# Patient Record
Sex: MALE | Race: WHITE | URBAN - METROPOLITAN AREA
[De-identification: names, ages, dates, MRNs, and addresses within clinical notes are randomized per-mention and may not be internally consistent; named-entity substitution may affect disease eponyms.]

---

## 2017-05-17 ENCOUNTER — HOSPITAL ENCOUNTER (OUTPATIENT)
Dept: NON INVASIVE DIAGNOSTICS | Age: 67
Discharge: HOME | End: 2017-05-17
Attending: INTERNAL MEDICINE | Admitting: INTERNAL MEDICINE

## 2017-05-17 ENCOUNTER — OFFICE VISIT (OUTPATIENT)
Dept: CARDIOLOGY CLINIC | Age: 67
End: 2017-05-17

## 2017-05-17 ENCOUNTER — HOSPITAL ENCOUNTER (OUTPATIENT)
Dept: OTHER | Age: 67
Discharge: HOME | End: 2017-05-17
Attending: INTERNAL MEDICINE | Admitting: INTERNAL MEDICINE

## 2017-05-17 VITALS
BODY MASS INDEX: 31.71 KG/M2 | HEIGHT: 67 IN | HEART RATE: 80 BPM | SYSTOLIC BLOOD PRESSURE: 106 MMHG | WEIGHT: 202 LBS | DIASTOLIC BLOOD PRESSURE: 72 MMHG

## 2017-05-17 DIAGNOSIS — I25.10 CORONARY ARTERY DISEASE INVOLVING NATIVE CORONARY ARTERY OF NATIVE HEART WITHOUT ANGINA PECTORIS: ICD-10-CM

## 2017-05-17 DIAGNOSIS — I25.5 CARDIOMYOPATHY, ISCHEMIC: ICD-10-CM

## 2017-05-17 DIAGNOSIS — E78.2 MIXED HYPERLIPIDEMIA: ICD-10-CM

## 2017-05-17 DIAGNOSIS — I50.22 SYSTOLIC CHF, CHRONIC (HCC): ICD-10-CM

## 2017-05-17 DIAGNOSIS — I50.22 CHRONIC SYSTOLIC CONGESTIVE HEART FAILURE (HCC): ICD-10-CM

## 2017-05-17 DIAGNOSIS — I50.22 SYSTOLIC CHF, CHRONIC (HCC): Primary | ICD-10-CM

## 2017-05-17 LAB
ANION GAP SERPL CALCULATED.3IONS-SCNC: 12 MMOL/L (ref 3–16)
BUN BLDV-MCNC: 18 MG/DL (ref 7–20)
CALCIUM SERPL-MCNC: 8.7 MG/DL (ref 8.3–10.6)
CHLORIDE BLD-SCNC: 103 MMOL/L (ref 99–110)
CO2: 26 MMOL/L (ref 21–32)
CREAT SERPL-MCNC: 1.4 MG/DL (ref 0.8–1.3)
GFR AFRICAN AMERICAN: >60
GFR NON-AFRICAN AMERICAN: 51
GLUCOSE BLD-MCNC: 145 MG/DL (ref 70–99)
HCT VFR BLD CALC: 48 % (ref 40.5–52.5)
HEMOGLOBIN: 15.5 G/DL (ref 13.5–17.5)
MCH RBC QN AUTO: 29.9 PG (ref 26–34)
MCHC RBC AUTO-ENTMCNC: 32.3 G/DL (ref 31–36)
MCV RBC AUTO: 92.4 FL (ref 80–100)
PDW BLD-RTO: 13.4 % (ref 12.4–15.4)
PLATELET # BLD: 145 K/UL (ref 135–450)
PMV BLD AUTO: 9.7 FL (ref 5–10.5)
POTASSIUM SERPL-SCNC: 4.8 MMOL/L (ref 3.5–5.1)
PRO-BNP: 542 PG/ML (ref 0–124)
RBC # BLD: 5.2 M/UL (ref 4.2–5.9)
SODIUM BLD-SCNC: 141 MMOL/L (ref 136–145)
WBC # BLD: 7 K/UL (ref 4–11)

## 2017-05-17 PROCEDURE — 99214 OFFICE O/P EST MOD 30 MIN: CPT | Performed by: INTERNAL MEDICINE

## 2017-05-17 RX ORDER — GLIPIZIDE 5 MG/1
5 TABLET ORAL DAILY
COMMUNITY
End: 2019-10-10 | Stop reason: ALTCHOICE

## 2017-05-17 RX ORDER — ACETAMINOPHEN 500 MG
500 TABLET ORAL EVERY 6 HOURS PRN
COMMUNITY

## 2017-05-28 DIAGNOSIS — I25.10 CORONARY ARTERY DISEASE INVOLVING NATIVE CORONARY ARTERY OF NATIVE HEART WITHOUT ANGINA PECTORIS: ICD-10-CM

## 2017-05-28 DIAGNOSIS — E78.5 HYPERLIPIDEMIA: ICD-10-CM

## 2017-05-28 DIAGNOSIS — I25.5 CARDIOMYOPATHY, ISCHEMIC: ICD-10-CM

## 2017-05-28 DIAGNOSIS — N18.9 CKD (CHRONIC KIDNEY DISEASE), UNSPECIFIED STAGE: ICD-10-CM

## 2017-05-28 DIAGNOSIS — I50.22 SYSTOLIC CHF, CHRONIC (HCC): ICD-10-CM

## 2017-05-30 RX ORDER — VALSARTAN 320 MG/1
TABLET ORAL
Qty: 30 TABLET | Refills: 4 | OUTPATIENT
Start: 2017-05-30

## 2017-06-08 ENCOUNTER — HOSPITAL ENCOUNTER (OUTPATIENT)
Dept: OTHER | Age: 67
Discharge: HOME | End: 2017-06-08
Attending: NURSE PRACTITIONER | Admitting: NURSE PRACTITIONER

## 2017-06-08 ENCOUNTER — OFFICE VISIT (OUTPATIENT)
Dept: CARDIOLOGY CLINIC | Age: 67
End: 2017-06-08

## 2017-06-08 VITALS
BODY MASS INDEX: 31.86 KG/M2 | OXYGEN SATURATION: 97 % | WEIGHT: 203 LBS | SYSTOLIC BLOOD PRESSURE: 108 MMHG | DIASTOLIC BLOOD PRESSURE: 60 MMHG | HEART RATE: 82 BPM | HEIGHT: 67 IN | RESPIRATION RATE: 16 BRPM

## 2017-06-08 DIAGNOSIS — I27.20 PULMONARY HYPERTENSION (HCC): ICD-10-CM

## 2017-06-08 DIAGNOSIS — I50.22 SYSTOLIC CHF, CHRONIC (HCC): Primary | ICD-10-CM

## 2017-06-08 DIAGNOSIS — I42.9 CARDIOMYOPATHY (HCC): ICD-10-CM

## 2017-06-08 DIAGNOSIS — I25.10 CORONARY ARTERY DISEASE INVOLVING NATIVE CORONARY ARTERY OF NATIVE HEART WITHOUT ANGINA PECTORIS: ICD-10-CM

## 2017-06-08 LAB
ANION GAP SERPL CALCULATED.3IONS-SCNC: 13 MMOL/L (ref 3–16)
BUN BLDV-MCNC: 22 MG/DL (ref 7–20)
CALCIUM SERPL-MCNC: 9.4 MG/DL (ref 8.3–10.6)
CHLORIDE BLD-SCNC: 102 MMOL/L (ref 99–110)
CO2: 27 MMOL/L (ref 21–32)
CREAT SERPL-MCNC: 1.3 MG/DL (ref 0.8–1.3)
GFR AFRICAN AMERICAN: >60
GFR NON-AFRICAN AMERICAN: 55
GLUCOSE BLD-MCNC: 334 MG/DL (ref 70–99)
POTASSIUM SERPL-SCNC: 5.2 MMOL/L (ref 3.5–5.1)
PRO-BNP: 440 PG/ML (ref 0–124)
SODIUM BLD-SCNC: 142 MMOL/L (ref 136–145)

## 2017-06-08 PROCEDURE — 99214 OFFICE O/P EST MOD 30 MIN: CPT | Performed by: NURSE PRACTITIONER

## 2017-06-10 DIAGNOSIS — I25.5 CARDIOMYOPATHY, ISCHEMIC: ICD-10-CM

## 2017-06-10 DIAGNOSIS — E87.5 HYPERKALEMIA: Primary | ICD-10-CM

## 2017-06-12 ENCOUNTER — TELEPHONE (OUTPATIENT)
Dept: CARDIOLOGY CLINIC | Age: 67
End: 2017-06-12

## 2017-06-16 ENCOUNTER — HOSPITAL ENCOUNTER (OUTPATIENT)
Dept: NON INVASIVE DIAGNOSTICS | Age: 67
Discharge: HOME | End: 2017-06-16
Attending: INTERNAL MEDICINE | Admitting: INTERNAL MEDICINE

## 2017-06-16 DIAGNOSIS — I25.5 ISCHEMIC CARDIOMYOPATHY: ICD-10-CM

## 2017-06-16 LAB
LV EF: 53 %
LVEF MODALITY: NORMAL

## 2017-06-16 RX ORDER — AMINOPHYLLINE DIHYDRATE 25 MG/ML
100 INJECTION, SOLUTION INTRAVENOUS ONCE
Status: COMPLETED | OUTPATIENT
Start: 2017-06-16 | End: 2017-06-16

## 2017-06-16 RX ADMIN — AMINOPHYLLINE DIHYDRATE 100 MG: 25 INJECTION, SOLUTION INTRAVENOUS at 09:38

## 2017-06-20 ENCOUNTER — TELEPHONE (OUTPATIENT)
Dept: CARDIOLOGY CLINIC | Age: 67
End: 2017-06-20

## 2017-08-04 ENCOUNTER — OFFICE VISIT (OUTPATIENT)
Dept: CARDIOLOGY CLINIC | Age: 67
End: 2017-08-04

## 2017-08-04 ENCOUNTER — HOSPITAL ENCOUNTER (OUTPATIENT)
Dept: OTHER | Age: 67
Discharge: HOME | End: 2017-08-04
Attending: NURSE PRACTITIONER | Admitting: NURSE PRACTITIONER

## 2017-08-04 VITALS
DIASTOLIC BLOOD PRESSURE: 54 MMHG | BODY MASS INDEX: 32.33 KG/M2 | RESPIRATION RATE: 16 BRPM | HEART RATE: 88 BPM | WEIGHT: 206 LBS | SYSTOLIC BLOOD PRESSURE: 92 MMHG | HEIGHT: 67 IN | OXYGEN SATURATION: 96 %

## 2017-08-04 DIAGNOSIS — E87.5 HYPERKALEMIA: ICD-10-CM

## 2017-08-04 DIAGNOSIS — I25.5 ISCHEMIC CARDIOMYOPATHY: ICD-10-CM

## 2017-08-04 DIAGNOSIS — I50.22 CHRONIC SYSTOLIC HEART FAILURE (HCC): Primary | ICD-10-CM

## 2017-08-04 DIAGNOSIS — I25.5 CARDIOMYOPATHY, ISCHEMIC: ICD-10-CM

## 2017-08-04 DIAGNOSIS — I25.10 CORONARY ARTERY DISEASE INVOLVING NATIVE CORONARY ARTERY OF NATIVE HEART WITHOUT ANGINA PECTORIS: ICD-10-CM

## 2017-08-04 LAB
ANION GAP SERPL CALCULATED.3IONS-SCNC: 14 MMOL/L (ref 3–16)
BUN BLDV-MCNC: 28 MG/DL (ref 7–20)
CALCIUM SERPL-MCNC: 8.9 MG/DL (ref 8.3–10.6)
CHLORIDE BLD-SCNC: 101 MMOL/L (ref 99–110)
CO2: 25 MMOL/L (ref 21–32)
CREAT SERPL-MCNC: 1.6 MG/DL (ref 0.8–1.3)
GFR AFRICAN AMERICAN: 53
GFR NON-AFRICAN AMERICAN: 43
GLUCOSE BLD-MCNC: 267 MG/DL (ref 70–99)
POTASSIUM SERPL-SCNC: 4 MMOL/L (ref 3.5–5.1)
SODIUM BLD-SCNC: 140 MMOL/L (ref 136–145)

## 2017-08-04 PROCEDURE — 99214 OFFICE O/P EST MOD 30 MIN: CPT | Performed by: NURSE PRACTITIONER

## 2017-08-07 ENCOUNTER — TELEPHONE (OUTPATIENT)
Dept: CARDIOLOGY CLINIC | Age: 67
End: 2017-08-07

## 2017-08-07 DIAGNOSIS — N18.9 CKD (CHRONIC KIDNEY DISEASE), UNSPECIFIED STAGE: Primary | ICD-10-CM

## 2017-08-10 ENCOUNTER — TELEPHONE (OUTPATIENT)
Dept: CARDIOLOGY CLINIC | Age: 67
End: 2017-08-10

## 2017-10-05 ENCOUNTER — OFFICE VISIT (OUTPATIENT)
Dept: CARDIOLOGY CLINIC | Age: 67
End: 2017-10-05

## 2017-10-05 ENCOUNTER — HOSPITAL ENCOUNTER (OUTPATIENT)
Dept: NON INVASIVE DIAGNOSTICS | Age: 67
Discharge: HOME | End: 2017-10-05
Attending: NURSE PRACTITIONER | Admitting: NURSE PRACTITIONER

## 2017-10-05 ENCOUNTER — TELEPHONE (OUTPATIENT)
Dept: CARDIOLOGY CLINIC | Age: 67
End: 2017-10-05

## 2017-10-05 VITALS
OXYGEN SATURATION: 97 % | WEIGHT: 205.8 LBS | SYSTOLIC BLOOD PRESSURE: 116 MMHG | HEART RATE: 72 BPM | BODY MASS INDEX: 32.3 KG/M2 | RESPIRATION RATE: 15 BRPM | HEIGHT: 67 IN | DIASTOLIC BLOOD PRESSURE: 68 MMHG

## 2017-10-05 DIAGNOSIS — I27.20 PULMONARY HTN (HCC): ICD-10-CM

## 2017-10-05 DIAGNOSIS — I50.22 CHRONIC SYSTOLIC CHF (CONGESTIVE HEART FAILURE) (HCC): Primary | ICD-10-CM

## 2017-10-05 DIAGNOSIS — I50.22 CHRONIC SYSTOLIC CONGESTIVE HEART FAILURE (HCC): ICD-10-CM

## 2017-10-05 DIAGNOSIS — I25.5 ISCHEMIC CARDIOMYOPATHY: ICD-10-CM

## 2017-10-05 DIAGNOSIS — I25.10 CORONARY ARTERY DISEASE INVOLVING NATIVE CORONARY ARTERY OF NATIVE HEART WITHOUT ANGINA PECTORIS: ICD-10-CM

## 2017-10-05 LAB
LEFT VENTRICULAR EJECTION FRACTION HIGH VALUE: 40 %
LEFT VENTRICULAR EJECTION FRACTION MODE: NORMAL
LV EF: 35 %

## 2017-10-05 PROCEDURE — 99214 OFFICE O/P EST MOD 30 MIN: CPT | Performed by: NURSE PRACTITIONER

## 2017-10-05 RX ORDER — FUROSEMIDE 20 MG/1
TABLET ORAL
Qty: 30 TABLET | Refills: 1 | Status: SHIPPED | OUTPATIENT
Start: 2017-10-05 | End: 2019-10-10 | Stop reason: SDUPTHER

## 2017-10-05 NOTE — PROGRESS NOTES
on file    Alcohol use No     Social History   Substance Use Topics    Smoking status: Never Smoker    Smokeless tobacco: Not on file    Alcohol use No     Review of Systems:   Constitutional: No significant change in weight, fatigue  HEENT: No change in vision or ringing in the ears. Respiratory: No CARDENAS, PND, orthopnea, cough. Cardiovascular: See HPI  GI: No n/v, abdominal pain or changes in bowel habits. No melena, no hematochezia  : No dysuria or hematuria. Skin: No rash or new skin lesions. Musculoskeletal: No new muscle or joint pain. Neurological: No lightheadedness, dizziness, syncope or TIA-like symptoms. Psychiatric: No anxiety, insomnia or depression    Physical Exam:  /68 (Site: Right Arm, Position: Sitting, Cuff Size: Medium Adult)  Pulse 72  Resp 15  Ht 5' 7\" (1.702 m)  Wt 205 lb 12.8 oz (93.4 kg)  SpO2 97%  BMI 32.23 kg/m2    General:  Awake, alert, oriented in NAD  Skin:  Warm and dry. No unusual bruising or rash  HEENT: Normocephalic and atraumatic. Oral mucosa moist.  Neck:  Supple. No JVP appreciated  Chest:  Normal effort.   Clear to auscultation  Cardiovascular:  RRR, S1/S2, no murmur/gallop/rub  Abdomen:  Soft, nontender, +bowel sounds  Extremities:  No edema  Neurological: No focal deficits  Psychological: Normal mood and affect      Current Outpatient Prescriptions   Medication Sig Dispense Refill    sacubitril-valsartan (ENTRESTO)  MG per tablet Take 1 tablet by mouth 2 times daily (Patient taking differently: Take 1 tablet by mouth 2 times daily Indications: taking 1/2 tablet twice a day ) 60 tablet 0    acetaminophen (TYLENOL) 500 MG tablet Take 500 mg by mouth every 6 hours as needed for Pain      Cholecalciferol (VITAMIN D3) 67600 UNITS CAPS Take by mouth once a week      glipiZIDE (GLUCOTROL) 5 MG tablet Take 5 mg by mouth daily      spironolactone (ALDACTONE) 25 MG tablet TAKE ONE-HALF TABLET BY MOUTH EVERY DAY (Patient taking differently: TAKE ONE TABLET BY MOUTH EVERY DAY) 45 tablet 3    furosemide (LASIX) 20 MG tablet Take as needed with increased swelling. 30 tablet 6    pravastatin (PRAVACHOL) 40 MG tablet Take 1 tablet by mouth daily. 90 tablet 3    insulin glargine (LANTUS) 100 UNIT/ML injection Inject 50 Units into the skin 2 times daily.  omeprazole (PRILOSEC) 20 MG capsule Take 20 mg by mouth daily.  carvedilol (COREG) 12.5 MG tablet Take 12.5 mg by mouth 2 times daily (with meals).  warfarin (COUMADIN) 5 MG tablet Take 4 mg by mouth daily at 1800 Managed by Dr. Toussaint Smoker aspirin 81 MG EC tablet Take 81 mg by mouth daily. No current facility-administered medications for this visit. Labs:   Lab Results   Component Value Date    WBC 7.0 05/17/2017    HGB 15.5 05/17/2017    HCT 48.0 05/17/2017    MCV 92.4 05/17/2017     05/17/2017     Lab Results   Component Value Date     08/04/2017    K 4.0 08/04/2017     08/04/2017    CO2 25 08/04/2017    BUN 28 08/04/2017    CREATININE 1.6 08/04/2017    GLUCOSE 267 08/04/2017    CALCIUM 8.9 08/04/2017      Lab Results   Component Value Date    TRIG 103 10/27/2014    HDL 35 10/27/2014    HDL 37 04/27/2010    LDLCALC 102 10/27/2014    LABVLDL 21 10/27/2014       Diagnostics:    Echo 5/17/2017:  Summary  Technically limited examination to evaluated EF. Decreased left ventricular systolic function with anteroseptal and apical hypokinesis. Estimated EF 30%. Mild to moderate mitral regurgitation. The appears left atrium dilated. No evidence of any pericardial effusion. There is moderate tricuspid regurgitation with RVSP estimated at 49 mmHg. Echo 4/29/2016:  Summary   -Normal left ventricle size and wall thickness. Left ventricular function is reduced with ejection fraction estimated at 35-40 %. There is hypokinesis of the anteroseptal and anterolateral walls.   -Mitral annular calcification is present.  Mild to moderate mitral   regurgitation is

## 2017-10-05 NOTE — MR AVS SNAPSHOT
After Visit Summary             Gary Pillai   10/5/2017 1:00 PM   Office Visit    Description:  Male : 1950   Provider:  Mary Lopez CNP   Department:  [address] [place] [address] Cardiology - [address] [place] [address] and Future Appointments         Below is a list of your follow-up and future appointments. This may not be a complete list as you may have made appointments directly with providers that we are not aware of or your providers may have made some for you. Please call your providers to confirm appointments. It is important to keep your appointments. Please bring your current insurance card, photo ID, co-pay, and all medication bottles to your appointment. If self-pay, payment is expected at the time of service. Your To-Do List     Future Orders Complete By Expires    BASIC METABOLIC PANEL [ERD26 Custom]  10/5/2017 10/5/2018    BRAIN NATRIURETIC PEPTIDE (BNP) [PDP074 Custom]  10/5/2017 10/5/2018    Follow-Up    Return in about 3 months (around 2018). Information from Your Visit        Department     Name Address Phone Fax    [address] [place] [address] [address] [facility]  [address] [facility] [address]  [address] [phone]      You Were Seen for:         Comments    Chronic systolic CHF (congestive heart failure) [facility])   [[id number]]         Vital Signs     Blood Pressure Pulse Respirations Height Weight Oxygen Saturation    116/68 (Site: Right Arm, Position: Sitting, Cuff Size: Medium Adult) 72 15 5' 7\" (1.702 m) 205 lb 12.8 oz (93.4 kg) 97%    Body Mass Index Smoking Status                32.23 kg/m2 Never Smoker          Additional Information about your Body Mass Index (BMI)           Your BMI as listed above is considered obese (30 or more). BMI is an estimate of body fat, calculated from your height and weight.   The higher your BMI, the greater your risk of heart disease, high blood pressure, type 2 diabetes, stroke, gallstones, arthritis, sleep apnea, and certain

## 2017-10-05 NOTE — TELEPHONE ENCOUNTER
I called [address] to let them know directions should be lasix 20 mg 1 tablet po daily as needed for increased swelling.

## 2017-10-06 ENCOUNTER — TELEPHONE (OUTPATIENT)
Dept: CARDIOLOGY CLINIC | Age: 67
End: 2017-10-06

## 2017-10-06 NOTE — TELEPHONE ENCOUNTER
Echocardiogram limited   Status:  Final result   Visible to patient:  No (Not Released) Order: [id number]       Notes Recorded by Rob Reveles CNP on 10/5/2017 at 9:00 PM  Notify patient echo shows EF 35-40% as we discussed in office. Thank you. LVM for patient with above results. Instructed to call office if any questions.

## 2017-10-24 ENCOUNTER — HOSPITAL ENCOUNTER (OUTPATIENT)
Dept: OTHER | Age: 67
Discharge: HOME | End: 2017-10-24
Attending: NURSE PRACTITIONER | Admitting: NURSE PRACTITIONER

## 2017-10-24 DIAGNOSIS — I50.22 CHRONIC SYSTOLIC CHF (CONGESTIVE HEART FAILURE) (HCC): ICD-10-CM

## 2017-10-24 LAB
ANION GAP SERPL CALCULATED.3IONS-SCNC: 15 MMOL/L (ref 3–16)
BUN BLDV-MCNC: 22 MG/DL (ref 7–20)
CALCIUM SERPL-MCNC: 9.3 MG/DL (ref 8.3–10.6)
CHLORIDE BLD-SCNC: 101 MMOL/L (ref 99–110)
CO2: 24 MMOL/L (ref 21–32)
CREAT SERPL-MCNC: 1.5 MG/DL (ref 0.8–1.3)
GFR AFRICAN AMERICAN: 57
GFR NON-AFRICAN AMERICAN: 47
GLUCOSE BLD-MCNC: 270 MG/DL (ref 70–99)
POTASSIUM SERPL-SCNC: 4.6 MMOL/L (ref 3.5–5.1)
PRO-BNP: 305 PG/ML (ref 0–124)
SODIUM BLD-SCNC: 140 MMOL/L (ref 136–145)

## 2017-10-25 ENCOUNTER — TELEPHONE (OUTPATIENT)
Dept: CARDIOLOGY CLINIC | Age: 67
End: 2017-10-25

## 2017-10-25 DIAGNOSIS — I25.5 CARDIOMYOPATHY, ISCHEMIC: Primary | ICD-10-CM

## 2017-10-25 NOTE — TELEPHONE ENCOUNTER
Notes Recorded by Venus Márquez CNP on 10/25/2017 at 11:02 AM EDT  Notify patient recent labs are stable. Continue current medications. Will need to have labs rechecked in 2 months. Orders placed. Thanks.     Patient informed

## 2018-01-16 ENCOUNTER — OFFICE VISIT (OUTPATIENT)
Dept: CARDIOLOGY CLINIC | Age: 68
End: 2018-01-16

## 2018-01-16 ENCOUNTER — HOSPITAL ENCOUNTER (OUTPATIENT)
Dept: OTHER | Age: 68
Discharge: HOME | End: 2018-01-16
Attending: NURSE PRACTITIONER | Admitting: NURSE PRACTITIONER

## 2018-01-16 VITALS
DIASTOLIC BLOOD PRESSURE: 64 MMHG | SYSTOLIC BLOOD PRESSURE: 96 MMHG | HEIGHT: 67 IN | OXYGEN SATURATION: 99 % | WEIGHT: 201 LBS | BODY MASS INDEX: 31.55 KG/M2 | HEART RATE: 72 BPM

## 2018-01-16 DIAGNOSIS — I25.10 CORONARY ARTERY DISEASE INVOLVING NATIVE CORONARY ARTERY OF NATIVE HEART WITHOUT ANGINA PECTORIS: ICD-10-CM

## 2018-01-16 DIAGNOSIS — I50.22 SYSTOLIC CHF, CHRONIC (HCC): Primary | ICD-10-CM

## 2018-01-16 DIAGNOSIS — I50.22 SYSTOLIC CHF, CHRONIC (HCC): ICD-10-CM

## 2018-01-16 DIAGNOSIS — I25.5 ISCHEMIC CARDIOMYOPATHY: ICD-10-CM

## 2018-01-16 LAB
ANION GAP SERPL CALCULATED.3IONS-SCNC: 9 MMOL/L (ref 3–16)
BUN BLDV-MCNC: 20 MG/DL (ref 7–20)
CALCIUM SERPL-MCNC: 9 MG/DL (ref 8.3–10.6)
CHLORIDE BLD-SCNC: 103 MMOL/L (ref 99–110)
CO2: 28 MMOL/L (ref 21–32)
CREAT SERPL-MCNC: 1.5 MG/DL (ref 0.8–1.3)
GFR AFRICAN AMERICAN: 57
GFR NON-AFRICAN AMERICAN: 47
GLUCOSE BLD-MCNC: 231 MG/DL (ref 70–99)
HCT VFR BLD CALC: 47.9 % (ref 40.5–52.5)
HEMOGLOBIN: 16.2 G/DL (ref 13.5–17.5)
MCH RBC QN AUTO: 31.5 PG (ref 26–34)
MCHC RBC AUTO-ENTMCNC: 33.8 G/DL (ref 31–36)
MCV RBC AUTO: 93.1 FL (ref 80–100)
PDW BLD-RTO: 13.3 % (ref 12.4–15.4)
PLATELET # BLD: 154 K/UL (ref 135–450)
PMV BLD AUTO: 10 FL (ref 5–10.5)
POTASSIUM SERPL-SCNC: 4.9 MMOL/L (ref 3.5–5.1)
PRO-BNP: 330 PG/ML (ref 0–124)
RBC # BLD: 5.14 M/UL (ref 4.2–5.9)
SODIUM BLD-SCNC: 140 MMOL/L (ref 136–145)
WBC # BLD: 7.3 K/UL (ref 4–11)

## 2018-01-16 PROCEDURE — G8417 CALC BMI ABV UP PARAM F/U: HCPCS | Performed by: NURSE PRACTITIONER

## 2018-01-16 PROCEDURE — G8427 DOCREV CUR MEDS BY ELIG CLIN: HCPCS | Performed by: NURSE PRACTITIONER

## 2018-01-16 PROCEDURE — 1123F ACP DISCUSS/DSCN MKR DOCD: CPT | Performed by: NURSE PRACTITIONER

## 2018-01-16 PROCEDURE — 99214 OFFICE O/P EST MOD 30 MIN: CPT | Performed by: NURSE PRACTITIONER

## 2018-01-16 PROCEDURE — 1036F TOBACCO NON-USER: CPT | Performed by: NURSE PRACTITIONER

## 2018-01-16 PROCEDURE — G8484 FLU IMMUNIZE NO ADMIN: HCPCS | Performed by: NURSE PRACTITIONER

## 2018-01-16 PROCEDURE — 3017F COLORECTAL CA SCREEN DOC REV: CPT | Performed by: NURSE PRACTITIONER

## 2018-01-16 PROCEDURE — G8598 ASA/ANTIPLAT THER USED: HCPCS | Performed by: NURSE PRACTITIONER

## 2018-01-16 PROCEDURE — 4040F PNEUMOC VAC/ADMIN/RCVD: CPT | Performed by: NURSE PRACTITIONER

## 2018-01-16 RX ORDER — SPIRONOLACTONE 25 MG/1
TABLET ORAL
Qty: 45 TABLET | Refills: 3 | COMMUNITY
Start: 2018-01-16 | End: 2019-10-10 | Stop reason: SDUPTHER

## 2018-01-16 NOTE — PATIENT INSTRUCTIONS
1. Continue current medications. 2. Check labs today. 3. Follow up in 3 months, earlier for worsening.

## 2018-01-16 NOTE — PROGRESS NOTES
lesions. Musculoskeletal: No new muscle or joint pain. Neurological: No lightheadedness, dizziness, syncope or TIA-like symptoms. Psychiatric: No anxiety, insomnia or depression    Physical Exam:  BP 96/64   Pulse 72   Ht 5' 7\" (1.702 m)   Wt 201 lb (91.2 kg)   SpO2 99%   BMI 31.48 kg/m²     General:  Awake, alert, oriented in NAD  Skin:  Warm and dry. No unusual bruising or rash  HEENT: Normocephalic and atraumatic. Oral mucosa moist.  Neck:  Supple. No JVP appreciated  Chest:  Normal effort. Clear to auscultation  Cardiovascular:  RRR, S1/S2, no murmur/gallop/rub  Abdomen:  Soft, nontender, +bowel sounds  Extremities:  No edema  Neurological: No focal deficits  Psychological: Normal mood and affect      Current Outpatient Prescriptions   Medication Sig Dispense Refill    insulin aspart (NOVOLOG) 100 UNIT/ML injection vial Inject 10 Units into the skin 3 times daily (before meals)      furosemide (LASIX) 20 MG tablet Take as needed with increased swelling. 30 tablet 1    sacubitril-valsartan (ENTRESTO) 49-51 MG per tablet Take 1 tablet by mouth 2 times daily 180 tablet 1    acetaminophen (TYLENOL) 500 MG tablet Take 500 mg by mouth every 6 hours as needed for Pain      Cholecalciferol (VITAMIN D3) 12713 UNITS CAPS Take by mouth once a week      glipiZIDE (GLUCOTROL) 5 MG tablet Take 5 mg by mouth daily      spironolactone (ALDACTONE) 25 MG tablet TAKE ONE-HALF TABLET BY MOUTH EVERY DAY (Patient taking differently: TAKE ONE TABLET BY MOUTH EVERY DAY) 45 tablet 3    pravastatin (PRAVACHOL) 40 MG tablet Take 1 tablet by mouth daily. 90 tablet 3    insulin glargine (LANTUS) 100 UNIT/ML injection Inject 50 Units into the skin 2 times daily.  omeprazole (PRILOSEC) 20 MG capsule Take 20 mg by mouth daily.  carvedilol (COREG) 12.5 MG tablet Take 12.5 mg by mouth 2 times daily (with meals).         warfarin (COUMADIN) 5 MG tablet Take 4 mg by mouth daily at 1800 Managed by Dr. Trish Rudolph aspirin 81 MG EC tablet Take 81 mg by mouth daily. No current facility-administered medications for this visit. Labs:   Lab Results   Component Value Date    WBC 7.0 05/17/2017    HGB 15.5 05/17/2017    HCT 48.0 05/17/2017    MCV 92.4 05/17/2017     05/17/2017     Lab Results   Component Value Date     10/24/2017    K 4.6 10/24/2017     10/24/2017    CO2 24 10/24/2017    BUN 22 10/24/2017    CREATININE 1.5 10/24/2017    GLUCOSE 270 10/24/2017    CALCIUM 9.3 10/24/2017      Lab Results   Component Value Date    TRIG 103 10/27/2014    HDL 35 10/27/2014    HDL 37 04/27/2010    LDLCALC 102 10/27/2014    LABVLDL 21 10/27/2014       Diagnostics:    Echo 10/5/2017:  Summary   -Limited echo to evaluate ejection fraction.   -Left ventricular function is reduced with ejection fraction visually estimated at 35-40%. There is anteroseptal hypokinesis. Echo 5/17/2017:  Summary  Technically limited examination to evaluated EF. Decreased left ventricular systolic function with anteroseptal and apical hypokinesis. Estimated EF 30%. Mild to moderate mitral regurgitation. The appears left atrium dilated. No evidence of any pericardial effusion. There is moderate tricuspid regurgitation with RVSP estimated at 49 mmHg. Echo 4/29/2016:  Summary   -Normal left ventricle size and wall thickness. Left ventricular function is reduced with ejection fraction estimated at 35-40 %. There is hypokinesis of the anteroseptal and anterolateral walls.   -Mitral annular calcification is present. Mild to moderate mitral   regurgitation is present.   -Aortic valve appears sclerotic but opens adequately.   -There is mild-moderate tricuspid regurgitation with RVSP estimated at 57 mmHg. This is suggestive of moderate pulmonary hypertension.     MPI 6/16/2917:  Summary    -Medium-large sized anterior fixed defect of severe intensity consistent    with infarction in the territory of the mid and distal LAD .    -LV

## 2018-01-17 ENCOUNTER — TELEPHONE (OUTPATIENT)
Dept: CARDIOLOGY CLINIC | Age: 68
End: 2018-01-17

## 2018-04-24 ENCOUNTER — HOSPITAL ENCOUNTER (OUTPATIENT)
Dept: OTHER | Age: 68
Discharge: HOME | End: 2018-04-24
Attending: NURSE PRACTITIONER | Admitting: NURSE PRACTITIONER

## 2018-04-24 ENCOUNTER — OFFICE VISIT (OUTPATIENT)
Dept: CARDIOLOGY CLINIC | Age: 68
End: 2018-04-24

## 2018-04-24 VITALS
WEIGHT: 205.3 LBS | SYSTOLIC BLOOD PRESSURE: 112 MMHG | HEART RATE: 72 BPM | DIASTOLIC BLOOD PRESSURE: 70 MMHG | OXYGEN SATURATION: 99 % | HEIGHT: 67 IN | BODY MASS INDEX: 32.22 KG/M2 | RESPIRATION RATE: 16 BRPM

## 2018-04-24 DIAGNOSIS — I25.10 CORONARY ARTERY DISEASE INVOLVING NATIVE CORONARY ARTERY OF NATIVE HEART WITHOUT ANGINA PECTORIS: ICD-10-CM

## 2018-04-24 DIAGNOSIS — I50.22 CHRONIC SYSTOLIC HEART FAILURE (HCC): ICD-10-CM

## 2018-04-24 DIAGNOSIS — I25.5 ISCHEMIC CARDIOMYOPATHY: ICD-10-CM

## 2018-04-24 DIAGNOSIS — I50.22 CHRONIC SYSTOLIC HEART FAILURE (HCC): Primary | ICD-10-CM

## 2018-04-24 LAB
ANION GAP SERPL CALCULATED.3IONS-SCNC: 9 MMOL/L (ref 3–16)
BUN BLDV-MCNC: 17 MG/DL (ref 7–20)
CALCIUM SERPL-MCNC: 8.9 MG/DL (ref 8.3–10.6)
CHLORIDE BLD-SCNC: 103 MMOL/L (ref 99–110)
CO2: 28 MMOL/L (ref 21–32)
CREAT SERPL-MCNC: 1.4 MG/DL (ref 0.8–1.3)
GFR AFRICAN AMERICAN: >60
GFR NON-AFRICAN AMERICAN: 51
GLUCOSE BLD-MCNC: 146 MG/DL (ref 70–99)
POTASSIUM SERPL-SCNC: 5 MMOL/L (ref 3.5–5.1)
PRO-BNP: 423 PG/ML (ref 0–124)
SODIUM BLD-SCNC: 140 MMOL/L (ref 136–145)

## 2018-04-24 PROCEDURE — 1036F TOBACCO NON-USER: CPT | Performed by: NURSE PRACTITIONER

## 2018-04-24 PROCEDURE — 1123F ACP DISCUSS/DSCN MKR DOCD: CPT | Performed by: NURSE PRACTITIONER

## 2018-04-24 PROCEDURE — G8427 DOCREV CUR MEDS BY ELIG CLIN: HCPCS | Performed by: NURSE PRACTITIONER

## 2018-04-24 PROCEDURE — 3017F COLORECTAL CA SCREEN DOC REV: CPT | Performed by: NURSE PRACTITIONER

## 2018-04-24 PROCEDURE — 99214 OFFICE O/P EST MOD 30 MIN: CPT | Performed by: NURSE PRACTITIONER

## 2018-04-24 PROCEDURE — G8417 CALC BMI ABV UP PARAM F/U: HCPCS | Performed by: NURSE PRACTITIONER

## 2018-04-24 PROCEDURE — G8598 ASA/ANTIPLAT THER USED: HCPCS | Performed by: NURSE PRACTITIONER

## 2018-04-24 PROCEDURE — 4040F PNEUMOC VAC/ADMIN/RCVD: CPT | Performed by: NURSE PRACTITIONER

## 2018-04-25 ENCOUNTER — TELEPHONE (OUTPATIENT)
Dept: CARDIOLOGY CLINIC | Age: 68
End: 2018-04-25

## 2018-08-13 ENCOUNTER — HOSPITAL ENCOUNTER (OUTPATIENT)
Dept: OTHER | Age: 68
Discharge: HOME | End: 2018-08-13
Attending: NURSE PRACTITIONER | Admitting: NURSE PRACTITIONER

## 2018-08-13 DIAGNOSIS — I50.22 CHRONIC SYSTOLIC HEART FAILURE (HCC): ICD-10-CM

## 2018-08-13 LAB
ANION GAP SERPL CALCULATED.3IONS-SCNC: 12 MMOL/L (ref 3–16)
BUN BLDV-MCNC: 17 MG/DL (ref 7–20)
CALCIUM SERPL-MCNC: 9.2 MG/DL (ref 8.3–10.6)
CHLORIDE BLD-SCNC: 102 MMOL/L (ref 99–110)
CO2: 26 MMOL/L (ref 21–32)
CREAT SERPL-MCNC: 1.3 MG/DL (ref 0.8–1.3)
GFR AFRICAN AMERICAN: >60
GFR NON-AFRICAN AMERICAN: 55
GLUCOSE BLD-MCNC: 128 MG/DL (ref 70–99)
POTASSIUM SERPL-SCNC: 4.1 MMOL/L (ref 3.5–5.1)
SODIUM BLD-SCNC: 140 MMOL/L (ref 136–145)

## 2018-08-14 ENCOUNTER — TELEPHONE (OUTPATIENT)
Dept: CARDIOLOGY CLINIC | Age: 68
End: 2018-08-14

## 2018-09-28 ENCOUNTER — OFFICE VISIT (OUTPATIENT)
Dept: CARDIOLOGY CLINIC | Age: 68
End: 2018-09-28
Payer: MEDICARE

## 2018-09-28 VITALS
OXYGEN SATURATION: 98 % | WEIGHT: 205 LBS | HEIGHT: 67 IN | BODY MASS INDEX: 32.18 KG/M2 | DIASTOLIC BLOOD PRESSURE: 72 MMHG | HEART RATE: 78 BPM | SYSTOLIC BLOOD PRESSURE: 120 MMHG | RESPIRATION RATE: 16 BRPM

## 2018-09-28 DIAGNOSIS — I50.22 SYSTOLIC CHF, CHRONIC (HCC): Primary | ICD-10-CM

## 2018-09-28 DIAGNOSIS — I48.20 CHRONIC ATRIAL FIBRILLATION (HCC): ICD-10-CM

## 2018-09-28 DIAGNOSIS — I25.10 CORONARY ARTERY DISEASE INVOLVING NATIVE CORONARY ARTERY OF NATIVE HEART WITHOUT ANGINA PECTORIS: ICD-10-CM

## 2018-09-28 DIAGNOSIS — I25.5 ISCHEMIC CARDIOMYOPATHY: ICD-10-CM

## 2018-09-28 PROCEDURE — G8427 DOCREV CUR MEDS BY ELIG CLIN: HCPCS | Performed by: NURSE PRACTITIONER

## 2018-09-28 PROCEDURE — G8598 ASA/ANTIPLAT THER USED: HCPCS | Performed by: NURSE PRACTITIONER

## 2018-09-28 PROCEDURE — 99214 OFFICE O/P EST MOD 30 MIN: CPT | Performed by: NURSE PRACTITIONER

## 2018-09-28 PROCEDURE — 3017F COLORECTAL CA SCREEN DOC REV: CPT | Performed by: NURSE PRACTITIONER

## 2018-09-28 PROCEDURE — 1036F TOBACCO NON-USER: CPT | Performed by: NURSE PRACTITIONER

## 2018-09-28 PROCEDURE — 4040F PNEUMOC VAC/ADMIN/RCVD: CPT | Performed by: NURSE PRACTITIONER

## 2018-09-28 PROCEDURE — 1101F PT FALLS ASSESS-DOCD LE1/YR: CPT | Performed by: NURSE PRACTITIONER

## 2018-09-28 PROCEDURE — G8417 CALC BMI ABV UP PARAM F/U: HCPCS | Performed by: NURSE PRACTITIONER

## 2018-09-28 PROCEDURE — 1123F ACP DISCUSS/DSCN MKR DOCD: CPT | Performed by: NURSE PRACTITIONER

## 2018-09-28 NOTE — PROGRESS NOTES
pain.  Neurological: No lightheadedness, dizziness, syncope or TIA-like symptoms. Psychiatric: No anxiety, insomnia or depression    Physical Exam:  /72 (Site: Left Upper Arm, Position: Sitting, Cuff Size: Medium Adult)   Pulse 78   Resp 16   Ht 5' 7\" (1.702 m)   Wt 205 lb (93 kg)   SpO2 98%   BMI 32.11 kg/m²     General:  Awake, alert, oriented in NAD  Skin:  Warm and dry. No unusual bruising or rash  HEENT: Normocephalic and atraumatic. Oral mucosa moist.  Neck:  Supple. No JVP appreciated  Chest:  Normal effort. Clear to auscultation  Cardiovascular:  Irregular, S1/S2, no murmur/gallop/rub  Abdomen:  Soft, nontender, +bowel sounds  Extremities:  No edema  Neurological: No focal deficits  Psychological: Normal mood and affect      Current Outpatient Prescriptions   Medication Sig Dispense Refill    sacubitril-valsartan (ENTRESTO) 49-51 MG per tablet Take 1 tablet by mouth 2 times daily 180 tablet 1    insulin aspart (NOVOLOG) 100 UNIT/ML injection vial Inject 10 Units into the skin 3 times daily (before meals)      spironolactone (ALDACTONE) 25 MG tablet TAKE ONE TABLET BY MOUTH EVERY DAY 45 tablet 3    furosemide (LASIX) 20 MG tablet Take as needed with increased swelling. 30 tablet 1    acetaminophen (TYLENOL) 500 MG tablet Take 500 mg by mouth every 6 hours as needed for Pain      Cholecalciferol (VITAMIN D3) 03856 UNITS CAPS Take by mouth once a week      glipiZIDE (GLUCOTROL) 5 MG tablet Take 5 mg by mouth daily      pravastatin (PRAVACHOL) 40 MG tablet Take 1 tablet by mouth daily. 90 tablet 3    insulin glargine (LANTUS) 100 UNIT/ML injection Inject 50 Units into the skin 2 times daily.  omeprazole (PRILOSEC) 20 MG capsule Take 20 mg by mouth daily.  carvedilol (COREG) 12.5 MG tablet Take 12.5 mg by mouth 2 times daily (with meals).         warfarin (COUMADIN) 5 MG tablet Take 4 mg by mouth daily at 1800 Managed by Dr. Shruthi Reinoso aspirin 81 MG EC tablet Take 81 mg by anterolateral hypokinesis and ejection    fraction of 53 %.  -There is no evidence of stress induced ischemia. Assessment:  1. Systolic CHF, chronic. NYHA class I. Appears compensated. 2. Cardiomyopathy. EF 35-40%. On ARNI, evidence based beta blocker and aldosterone antagonist.      3.  Chronic atrial fib. Controlled rate. On warfarin dosed by PCP. 4. Coronary artery disease. Asymptomatic. On aspirin, statin and beta blocker therapy. No ischemia on recent stress test (6/2017). Plan:  1. Continue current medications. 2. Check labs (BMP) in next couple of months. 3. Follow up in 6 months, earlier for worsening. Thank you for allowing me to participate in the care of your patient. Lucy Hamilton, JAYASHREE        QUALITY MEASURES  1. Tobacco Cessation Counseling: N/A  2. BP retake if >140/90: N/A  3. Communication to PCP: yes  4. CAD antiplatelet therapy: yes  5. CAD lipid lowering therapy: yes  6. HF A.  Fib on anticoagulation: yes

## 2018-12-11 ENCOUNTER — TELEPHONE (OUTPATIENT)
Dept: CARDIOLOGY CLINIC | Age: 68
End: 2018-12-11

## 2019-03-29 ENCOUNTER — OFFICE VISIT (OUTPATIENT)
Dept: CARDIOLOGY CLINIC | Age: 69
End: 2019-03-29
Payer: MEDICARE

## 2019-03-29 VITALS
DIASTOLIC BLOOD PRESSURE: 74 MMHG | BODY MASS INDEX: 31.39 KG/M2 | HEIGHT: 67 IN | HEART RATE: 74 BPM | SYSTOLIC BLOOD PRESSURE: 126 MMHG | OXYGEN SATURATION: 100 % | WEIGHT: 200 LBS

## 2019-03-29 DIAGNOSIS — I25.5 ISCHEMIC CARDIOMYOPATHY: ICD-10-CM

## 2019-03-29 DIAGNOSIS — I50.22 CHRONIC SYSTOLIC HEART FAILURE (HCC): Primary | ICD-10-CM

## 2019-03-29 DIAGNOSIS — N18.30 STAGE 3 CHRONIC KIDNEY DISEASE (HCC): ICD-10-CM

## 2019-03-29 DIAGNOSIS — I48.20 CHRONIC ATRIAL FIBRILLATION (HCC): ICD-10-CM

## 2019-03-29 DIAGNOSIS — I25.10 CORONARY ARTERY DISEASE INVOLVING NATIVE CORONARY ARTERY OF NATIVE HEART WITHOUT ANGINA PECTORIS: ICD-10-CM

## 2019-03-29 PROCEDURE — 1123F ACP DISCUSS/DSCN MKR DOCD: CPT | Performed by: NURSE PRACTITIONER

## 2019-03-29 PROCEDURE — 1036F TOBACCO NON-USER: CPT | Performed by: NURSE PRACTITIONER

## 2019-03-29 PROCEDURE — G8598 ASA/ANTIPLAT THER USED: HCPCS | Performed by: NURSE PRACTITIONER

## 2019-03-29 PROCEDURE — G8484 FLU IMMUNIZE NO ADMIN: HCPCS | Performed by: NURSE PRACTITIONER

## 2019-03-29 PROCEDURE — 99214 OFFICE O/P EST MOD 30 MIN: CPT | Performed by: NURSE PRACTITIONER

## 2019-03-29 PROCEDURE — 4040F PNEUMOC VAC/ADMIN/RCVD: CPT | Performed by: NURSE PRACTITIONER

## 2019-03-29 PROCEDURE — G8417 CALC BMI ABV UP PARAM F/U: HCPCS | Performed by: NURSE PRACTITIONER

## 2019-03-29 PROCEDURE — G8427 DOCREV CUR MEDS BY ELIG CLIN: HCPCS | Performed by: NURSE PRACTITIONER

## 2019-03-29 PROCEDURE — 3017F COLORECTAL CA SCREEN DOC REV: CPT | Performed by: NURSE PRACTITIONER

## 2019-04-09 ENCOUNTER — TELEPHONE (OUTPATIENT)
Dept: CARDIOLOGY CLINIC | Age: 69
End: 2019-04-09

## 2019-04-09 NOTE — TELEPHONE ENCOUNTER
Check with patient to see if he had labs drawn. He was concerned about his kidney numbers at last office visit.

## 2019-04-10 NOTE — TELEPHONE ENCOUNTER
LVM requesting patient to get labs done at earliest convenience if not done already, and if done elsewhere, to please call us and let us know where so that we can call for results.     Home number was not working, called cell and LVM

## 2019-10-01 ENCOUNTER — TELEPHONE (OUTPATIENT)
Dept: CARDIOLOGY CLINIC | Age: 69
End: 2019-10-01

## 2019-10-10 ENCOUNTER — OFFICE VISIT (OUTPATIENT)
Dept: CARDIOLOGY CLINIC | Age: 69
End: 2019-10-10
Payer: MEDICARE

## 2019-10-10 VITALS
DIASTOLIC BLOOD PRESSURE: 68 MMHG | HEART RATE: 74 BPM | RESPIRATION RATE: 16 BRPM | SYSTOLIC BLOOD PRESSURE: 100 MMHG | WEIGHT: 203.6 LBS | OXYGEN SATURATION: 98 % | HEIGHT: 67 IN | BODY MASS INDEX: 31.96 KG/M2

## 2019-10-10 DIAGNOSIS — I25.10 CORONARY ARTERY DISEASE INVOLVING NATIVE CORONARY ARTERY OF NATIVE HEART WITHOUT ANGINA PECTORIS: ICD-10-CM

## 2019-10-10 DIAGNOSIS — I25.5 CARDIOMYOPATHY, ISCHEMIC: ICD-10-CM

## 2019-10-10 DIAGNOSIS — I50.22 CHRONIC SYSTOLIC HEART FAILURE (HCC): Primary | ICD-10-CM

## 2019-10-10 PROCEDURE — 1123F ACP DISCUSS/DSCN MKR DOCD: CPT | Performed by: NURSE PRACTITIONER

## 2019-10-10 PROCEDURE — 99213 OFFICE O/P EST LOW 20 MIN: CPT | Performed by: NURSE PRACTITIONER

## 2019-10-10 PROCEDURE — G8417 CALC BMI ABV UP PARAM F/U: HCPCS | Performed by: NURSE PRACTITIONER

## 2019-10-10 PROCEDURE — 4040F PNEUMOC VAC/ADMIN/RCVD: CPT | Performed by: NURSE PRACTITIONER

## 2019-10-10 PROCEDURE — G8427 DOCREV CUR MEDS BY ELIG CLIN: HCPCS | Performed by: NURSE PRACTITIONER

## 2019-10-10 PROCEDURE — 1036F TOBACCO NON-USER: CPT | Performed by: NURSE PRACTITIONER

## 2019-10-10 PROCEDURE — G8598 ASA/ANTIPLAT THER USED: HCPCS | Performed by: NURSE PRACTITIONER

## 2019-10-10 PROCEDURE — G8484 FLU IMMUNIZE NO ADMIN: HCPCS | Performed by: NURSE PRACTITIONER

## 2019-10-10 PROCEDURE — 3017F COLORECTAL CA SCREEN DOC REV: CPT | Performed by: NURSE PRACTITIONER

## 2019-10-10 RX ORDER — SPIRONOLACTONE 25 MG/1
TABLET ORAL
Qty: 90 TABLET | Refills: 3 | Status: SHIPPED | OUTPATIENT
Start: 2019-10-10 | End: 2020-10-23

## 2019-10-10 RX ORDER — FUROSEMIDE 20 MG/1
TABLET ORAL
Qty: 30 TABLET | Refills: 1 | Status: SHIPPED | OUTPATIENT
Start: 2019-10-10 | End: 2019-11-11 | Stop reason: SDUPTHER

## 2019-10-10 ASSESSMENT — ENCOUNTER SYMPTOMS
GASTROINTESTINAL NEGATIVE: 1
RESPIRATORY NEGATIVE: 1

## 2019-11-11 RX ORDER — FUROSEMIDE 20 MG/1
TABLET ORAL
Qty: 30 TABLET | Refills: 1 | Status: SHIPPED | OUTPATIENT
Start: 2019-11-11 | End: 2020-01-13

## 2020-01-13 RX ORDER — FUROSEMIDE 20 MG/1
TABLET ORAL
Qty: 30 TABLET | Refills: 0 | Status: SHIPPED | OUTPATIENT
Start: 2020-01-13 | End: 2020-02-10

## 2020-02-10 RX ORDER — FUROSEMIDE 20 MG/1
TABLET ORAL
Qty: 15 TABLET | Refills: 0 | Status: SHIPPED | OUTPATIENT
Start: 2020-02-10

## 2020-02-10 NOTE — TELEPHONE ENCOUNTER
Contacted the patient, he stated that he had labs done about three weeks ago at PCP, Dr. Manzanares [facility] office. I called [phone] to request labs be faxed to us. He did have labs in January, they will fax to us.

## 2020-02-21 ENCOUNTER — TELEPHONE (OUTPATIENT)
Dept: CARDIOLOGY CLINIC | Age: 70
End: 2020-02-21

## 2020-02-21 NOTE — TELEPHONE ENCOUNTER
Spoke to patient he stated his PCP has the Echo results and We can call his office to have them faxed over to our office. Spoke to [place] at patient's PCP office she will fax them over today.

## 2020-02-24 ENCOUNTER — TELEPHONE (OUTPATIENT)
Dept: CARDIOLOGY CLINIC | Age: 70
End: 2020-02-24

## 2020-05-08 ASSESSMENT — ENCOUNTER SYMPTOMS
GASTROINTESTINAL NEGATIVE: 1
RESPIRATORY NEGATIVE: 1

## 2020-05-08 NOTE — PROGRESS NOTES
[facility]   Congestive Heart Failure    PrimaryCare Doctor:  Remy Hood MD  Primary Cardiologist: Eliud Schafer       Chief Complaint:  CHF    History of Present Illness:  Isela Zuluaga is a 77 y.o. male with PMH CAD, MI, ICM, HFrEF (35-40%), HTN, HLD, AF who presents today for CHF f/u.   OV Oct 2019: no change, echo ordered- EF unchanged  Labs show Cr 1.6 on 2/11/20  No complaints, energy good, has been taking lasix every day for 2-3 weeks, stopped one week ago, going to start back up QOD  He denies chest pain, dyspnea, palpitations, orthopnea, PND, exertional chest pressure/discomfort, fatigue, early saiety, edema, syncope. ER Visit: No  Recent Hospitalization: No    Baseline Weight: 203    wts stable 201-207    EF: 35-40%  Cardiac Imaging:  Echo 10/5/2017:  Summary   -Limited echo to evaluate ejection fraction.   -Left ventricular function is reduced with ejection fraction visually estimated at 35-40%. There is anteroseptal hypokinesis.     Echo 5/17/2017:  Summary  Technically limited examination to evaluated EF. Decreased left ventricular systolic function with anteroseptal and apical hypokinesis. Estimated EF 30%. Mild to moderate mitral regurgitation. The appears left atrium dilated. No evidence of any pericardial effusion. There is moderate tricuspid regurgitation     MPI 6/16/2917:  Summary    -Medium-large sized anterior fixed defect of severe intensity consistent    with infarction in the territory of the mid and distal LAD .    -LV function is mildly reduced with anterolateral hypokinesis and ejection    fraction of 53 %.  -There is no evidence of stress induced ischemia. Device: No     Activity: at baseline  Can you walk 1-2 blocks or do a moderate amount of house/yard work? Yes      NYHA Class:  I     Sodium Restrictions: 3g  Fluid Restrictions: 48-64 oz/day  Sodium and fluid restriction compliance: not following    Pt Education: The patient has received education on the following topics: dietary sodium restriction, heart failure medications, the importance of physical activity, symptom management and weight monitoring     JALEESA No       Past Medical History:   has a past medical history of CAD (coronary artery disease), Cardiomyopathy ([facility] [address] CHF (congestive heart failure) ([facility] [address] [facility] and Hyperlipidemia. Surgical History:   has a past surgical history that includes Abdomen surgery; back surgery; Leg Surgery; Esophagus dilation; and eye surgery (Bilateral, 06/2017). Social History:   reports that he has never smoked. He has never used smokeless tobacco. He reports that he does not drink alcohol or use drugs. Family History:   Family History   Problem Relation Age of Onset    Coronary Art Dis Father     Heart Attack Father     Diabetes Father        HomeMedications:  Prior to Admission medications    Medication Sig Start Date End Date Taking? Authorizing Provider   furosemide (LASIX) 20 MG tablet TAKE ONE TABLET BY MOUTH DAILY AS NEEDED FOR INCREASED SWELLING 2/10/20   MELANIE Roblero CNP   sacubitril-valsartan (ENTRESTO) 49-51 MG per tablet TAKE ONE TABLET BY MOUTH TWICE A DAY 10/10/19   MELANIE Roblero CNP   spironolactone (ALDACTONE) 25 MG tablet TAKE ONE TABLET BY MOUTH EVERY DAY 10/10/19   MELANIE Roblero CNP   insulin aspart (NOVOLOG) 100 UNIT/ML injection vial Inject 10 Units into the skin 3 times daily (before meals)    Historical Provider, MD   acetaminophen (TYLENOL) 500 MG tablet Take 500 mg by mouth every 6 hours as needed for Pain    Historical Provider, MD   Cholecalciferol (VITAMIN D3) 11261 UNITS CAPS Take by mouth once a week    Historical Provider, MD   pravastatin (PRAVACHOL) 40 MG tablet Take 1 tablet by mouth daily. 5/22/13   Tab Ladd MD   insulin glargine (LANTUS) 100 UNIT/ML injection Inject 50 Units into the skin 2 times daily.       Historical Provider, MD   omeprazole (PRILOSEC) 20 MG capsule Take 20 mg by mouth

## 2020-05-11 ENCOUNTER — OFFICE VISIT (OUTPATIENT)
Dept: CARDIOLOGY CLINIC | Age: 70
End: 2020-05-11
Payer: MEDICARE

## 2020-05-11 VITALS
BODY MASS INDEX: 32.58 KG/M2 | HEIGHT: 67 IN | WEIGHT: 207.6 LBS | HEART RATE: 68 BPM | DIASTOLIC BLOOD PRESSURE: 80 MMHG | SYSTOLIC BLOOD PRESSURE: 118 MMHG

## 2020-05-11 PROCEDURE — 4040F PNEUMOC VAC/ADMIN/RCVD: CPT | Performed by: NURSE PRACTITIONER

## 2020-05-11 PROCEDURE — 1123F ACP DISCUSS/DSCN MKR DOCD: CPT | Performed by: NURSE PRACTITIONER

## 2020-05-11 PROCEDURE — 3017F COLORECTAL CA SCREEN DOC REV: CPT | Performed by: NURSE PRACTITIONER

## 2020-05-11 PROCEDURE — G8427 DOCREV CUR MEDS BY ELIG CLIN: HCPCS | Performed by: NURSE PRACTITIONER

## 2020-05-11 PROCEDURE — 1036F TOBACCO NON-USER: CPT | Performed by: NURSE PRACTITIONER

## 2020-05-11 PROCEDURE — 99213 OFFICE O/P EST LOW 20 MIN: CPT | Performed by: NURSE PRACTITIONER

## 2020-05-11 PROCEDURE — G8417 CALC BMI ABV UP PARAM F/U: HCPCS | Performed by: NURSE PRACTITIONER

## 2020-10-16 RX ORDER — SACUBITRIL AND VALSARTAN 49; 51 MG/1; MG/1
TABLET, FILM COATED ORAL
Qty: 180 TABLET | Refills: 1 | Status: SHIPPED | OUTPATIENT
Start: 2020-10-16 | End: 2021-04-15

## 2020-11-10 ASSESSMENT — ENCOUNTER SYMPTOMS
GASTROINTESTINAL NEGATIVE: 1
RESPIRATORY NEGATIVE: 1

## 2020-11-10 NOTE — PROGRESS NOTES
[address]   Congestive Heart Failure    PrimaryCare Doctor:  Uyen Mantilla MD  Primary Cardiologist: Kaleigh Mccray       Chief Complaint:  CHF    History of Present Illness:  Poli Norman is a 72 y.o. male with PMH CAD, MI, ICM, HFrEF (35-40%), HTN, HLD, AF who presents today for CHF f/u. OV 6 months ago, no change in meds  He denies chest pain, dyspnea, palpitations, orthopnea, PND, exertional chest pressure/discomfort, fatigue, early saiety, edema, syncope. Wt is stable and repeat echo in Feb unchanged  He has macular degeneration and is almost blind    ER Visit: No  Recent Hospitalization: No    Baseline Weight: 203    wts stable 201-207    EF: 35-40%  Cardiac Imaging: Echo 2/18/20  Summary:  The left atrium is moderately dilated. The anteroseptum wall is akinetic. The Aortic Valve is mildly calcified. No hemodynamically significant valvular aortic stenosis. The left ventricular function is moderately reduced. Overall left ventricular ejection fraction is estimated to be 35-40%. There is mild global hypokinesis of the left ventricle. Right ventricular systolic pressure is normal at <35 mmHg. Echo 10/5/2017:  Summary   -Limited echo to evaluate ejection fraction.   -Left ventricular function is reduced with ejection fraction visually estimated at 35-40%. There is anteroseptal hypokinesis.     Echo 5/17/2017:  Summary  Technically limited examination to evaluated EF. Decreased left ventricular systolic function with anteroseptal and apical hypokinesis. Estimated EF 30%. Mild to moderate mitral regurgitation. The appears left atrium dilated. No evidence of any pericardial effusion. There is moderate tricuspid regurgitation     MPI 6/16/2917:  Summary    -Medium-large sized anterior fixed defect of severe intensity consistent    with infarction in the territory of the mid and distal LAD .    -LV function is mildly reduced with anterolateral hypokinesis and ejection    fraction of 53 %.  -There is no evidence of stress induced ischemia. Device: No     Activity: at baseline  Can you walk 1-2 blocks or do a moderate amount of house/yard work? Yes      NYHA Class: I     Sodium Restrictions: 3g  Fluid Restrictions: 48-64 oz/day  Sodium and fluid restriction compliance: not following    Pt Education: The patient has received education on the following topics: dietary sodium restriction, heart failure medications, the importance of physical activity, symptom management and weight monitoring     JALEESA No       Past Medical History:   has a past medical history of CAD (coronary artery disease), Cardiomyopathy ([facility] [address] CHF (congestive heart failure) ([facility] [address] Diabetes mellitus ([facility] [address] and Hyperlipidemia. Surgical History:   has a past surgical history that includes Abdomen surgery; back surgery; Leg Surgery; Esophagus dilation; and eye surgery (Bilateral, 06/2017). Social History:   reports that he has never smoked. He has never used smokeless tobacco. He reports that he does not drink alcohol or use drugs. Family History:   Family History   Problem Relation Age of Onset    Coronary Art Dis Father     Heart Attack Father     Diabetes Father        HomeMedications:  Prior to Admission medications    Medication Sig Start Date End Date Taking?  Authorizing Provider   spironolactone (ALDACTONE) 25 MG tablet TAKE ONE TABLET BY MOUTH DAILY 10/23/20   MELANIE Capps CNP   sacubitril-valsartan (ENTRESTO) 49-51 MG per tablet TAKE ONE TABLET BY MOUTH TWICE A DAY 10/16/20   MELANIE Capps CNP   furosemide (LASIX) 20 MG tablet TAKE ONE TABLET BY MOUTH DAILY AS NEEDED FOR INCREASED SWELLING 2/10/20   MELANIE Capps CNP   insulin aspart (NOVOLOG) 100 UNIT/ML injection vial Inject 10 Units into the skin 3 times daily (before meals)    Historical Provider, MD   acetaminophen (TYLENOL) 500 MG tablet Take 500 mg by mouth every 6 hours as needed for Pain    Historical Provider, MD   Cholecalciferol (VITAMIN D3) 45605 UNITS CAPS Take by mouth once a week    Historical Provider, MD   pravastatin (PRAVACHOL) 40 MG tablet Take 1 tablet by mouth daily. 5/22/13   Mel Talamantes MD   insulin glargine (LANTUS) 100 UNIT/ML injection Inject 50 Units into the skin 2 times daily. Historical Provider, MD   omeprazole (PRILOSEC) 20 MG capsule Take 20 mg by mouth daily. Historical Provider, MD   carvedilol (COREG) 12.5 MG tablet Take 12.5 mg by mouth 2 times daily (with meals). Historical Provider, MD   warfarin (COUMADIN) 5 MG tablet Take 4 mg by mouth daily at 1800 Managed by Dr. Monika Ramirez Provider, MD   aspirin 81 MG EC tablet Take 81 mg by mouth daily. Historical Provider, MD        Allergies:  Unable to assess and Sulfa antibiotics     ROS:   Review of Systems   Constitutional: Negative. Respiratory: Negative. Cardiovascular: Negative. Gastrointestinal: Negative. Genitourinary: Negative. Musculoskeletal: Negative. Skin: Negative. Neurological: Negative. Hematological: Negative. Psychiatric/Behavioral: Negative. Physical Examination:    Vitals:    11/11/20 1217   BP: 96/64   Site: Right Upper Arm   Position: Sitting   Cuff Size: Medium Adult   Pulse: 93   SpO2: 98%   Weight: 208 lb 8 oz (94.6 kg)   Height: 5' 7\" (1.702 m)           Physical Exam  Constitutional:       Appearance: He is well-developed. HENT:      Head: Normocephalic and atraumatic. Eyes:      Pupils: Pupils are equal, round, and reactive to light. Neck:      Musculoskeletal: Normal range of motion and neck supple. Cardiovascular:      Rate and Rhythm: Normal rate and regular rhythm. Heart sounds: Normal heart sounds. Pulmonary:      Effort: Pulmonary effort is normal.      Breath sounds: Normal breath sounds. Abdominal:      Palpations: Abdomen is soft. Musculoskeletal: Normal range of motion. Skin:     General: Skin is warm and dry.    Neurological:      Mental Status: He is alert and oriented to person, place, and time. Psychiatric:         Behavior: Behavior normal.         Thought Content: Thought content normal.         Judgment: Judgment normal.         Lab Data:    CBC:   Lab Results   Component Value Date    WBC 7.3 01/16/2018    WBC 7.0 05/17/2017    WBC 7.2 04/27/2015    RBC 5.14 01/16/2018    RBC 5.20 05/17/2017    RBC 5.27 04/27/2015    HGB 16.2 01/16/2018    HGB 15.5 05/17/2017    HGB 16.3 04/27/2015    HCT 47.9 01/16/2018    HCT 48.0 05/17/2017    HCT 47.8 04/27/2015    MCV 93.1 01/16/2018    MCV 92.4 05/17/2017    MCV 90.8 04/27/2015    RDW 13.3 01/16/2018    RDW 13.4 05/17/2017    RDW 13.6 04/27/2015     01/16/2018     05/17/2017     04/27/2015     BMP:  Lab Results   Component Value Date     08/13/2018     04/24/2018     01/16/2018    K 4.1 08/13/2018    K 5.0 04/24/2018    K 4.9 01/16/2018     08/13/2018     04/24/2018     01/16/2018    CO2 26 08/13/2018    CO2 28 04/24/2018    CO2 28 01/16/2018    BUN 17 08/13/2018    BUN 17 04/24/2018    BUN 20 01/16/2018    CREATININE 1.3 08/13/2018    CREATININE 1.4 04/24/2018    CREATININE 1.5 01/16/2018     BNP:   Lab Results   Component Value Date    PROBNP 423 04/24/2018    PROBNP 330 01/16/2018    PROBNP 305 10/24/2017     Iron Studies:  No components found for: FE,  TIBC,  FERRITIN  Iron Deficiency Anemia:  No    IV Iron Therapy:  No  2017 ACC/AHA HF Guidelines:   intravenous iron replacement in patients with New York Heart Association (NYHA) class II and III HF and iron deficiency (ferritin <100 ng/ml or 100-300 ng/ml if transferrin saturation <20%), to improve functional status and QoL. Assessment/Plan:    1. Chronic systolic heart failure (HCC) - compensated, labs per PCP   2. Cardiomyopathy, ischemic - on Entresto, BB, sera   3.  Coronary artery disease involving native coronary artery of native heart without angina pectoris - stable, on statin, ASA Instructions:   1. Medications:continue current meds  2. Labs: per PCP in January  3. Lifestyle Recommendations: Weigh yourself every day in the morning after urination, call Jud Barker if wt increases 2-3lb in one day or 5lb in one week, Limit sodium to 2000mg/day and fluids to 2L or 64oz/day. 4. Follow up: 6 months      [facility]: [phone]      I appreciate the opportunity of cooperating in the care of this individual.    Ovidio Josue CNP, 11/10/2020,4:00 PM    QUALITY MEASURES  1. Tobacco Cessation Counseling: NA  2. Retake of BP if >140/90:   NA  3. Documentation to PCP/referring for new patient:  Sent to PCP at close of office visit  4. CAD patient on anti-platelet: Yes  5. CAD patient on STATIN therapy:  Yes  6. Patient with CHF and aFib on anticoagulation:  Yes   7. Patient Education:Yes   8. BB for LVSD :  Yes   9. ACE/ARB for LVSD:  Yes   10.  Left Ventricular Ejection Fraction (LVEF) Assessment:  Yes

## 2020-11-11 ENCOUNTER — OFFICE VISIT (OUTPATIENT)
Dept: CARDIOLOGY CLINIC | Age: 70
End: 2020-11-11
Payer: MEDICARE

## 2020-11-11 VITALS
DIASTOLIC BLOOD PRESSURE: 64 MMHG | OXYGEN SATURATION: 98 % | BODY MASS INDEX: 32.72 KG/M2 | WEIGHT: 208.5 LBS | HEART RATE: 93 BPM | HEIGHT: 67 IN | SYSTOLIC BLOOD PRESSURE: 96 MMHG

## 2020-11-11 PROCEDURE — G8427 DOCREV CUR MEDS BY ELIG CLIN: HCPCS | Performed by: NURSE PRACTITIONER

## 2020-11-11 PROCEDURE — G8417 CALC BMI ABV UP PARAM F/U: HCPCS | Performed by: NURSE PRACTITIONER

## 2020-11-11 PROCEDURE — 99214 OFFICE O/P EST MOD 30 MIN: CPT | Performed by: NURSE PRACTITIONER

## 2020-11-11 PROCEDURE — G8484 FLU IMMUNIZE NO ADMIN: HCPCS | Performed by: NURSE PRACTITIONER

## 2020-11-11 PROCEDURE — 1036F TOBACCO NON-USER: CPT | Performed by: NURSE PRACTITIONER

## 2020-11-11 PROCEDURE — 4040F PNEUMOC VAC/ADMIN/RCVD: CPT | Performed by: NURSE PRACTITIONER

## 2020-11-11 PROCEDURE — 3017F COLORECTAL CA SCREEN DOC REV: CPT | Performed by: NURSE PRACTITIONER

## 2020-11-11 PROCEDURE — 1123F ACP DISCUSS/DSCN MKR DOCD: CPT | Performed by: NURSE PRACTITIONER

## 2020-11-11 RX ORDER — SPIRONOLACTONE 25 MG/1
TABLET ORAL
Qty: 90 TABLET | Refills: 2 | Status: SHIPPED | OUTPATIENT
Start: 2020-11-11

## 2020-11-11 NOTE — PATIENT INSTRUCTIONS
Instructions:   1. Medications:continue current meds  2. Labs: per PCP in January  3. Lifestyle Recommendations: Weigh yourself every day in the morning after urination, call Yanick Lockwood if wt increases 2-3lb in one day or 5lb in one week, Limit sodium to 2000mg/day and fluids to 2L or 64oz/day.    4. Follow up: 6 months        [facility]: [phone]

## 2020-11-24 ENCOUNTER — TELEPHONE (OUTPATIENT)
Dept: CARDIOLOGY CLINIC | Age: 70
End: 2020-11-24

## 2020-11-24 NOTE — TELEPHONE ENCOUNTER
Pt calling he has no refills on his Rx spironolactone (ALDACTONE) 25 MG tablet and he states the pharmacy doesn't have a new script. Looks like we sent one on 11/11/2020 can this be resent?  Pls call to advise Thank you

## 2020-11-24 NOTE — TELEPHONE ENCOUNTER
Called and spoke to the pharmacy and they got everything figured out and fixed his medication will be ready later today to . Pharmacy already spoke to the patient about this message as well.

## 2021-04-15 RX ORDER — SACUBITRIL AND VALSARTAN 49; 51 MG/1; MG/1
TABLET, FILM COATED ORAL
Qty: 180 TABLET | Refills: 0 | Status: SHIPPED | OUTPATIENT
Start: 2021-04-15 | End: 2021-07-15

## 2021-05-04 ASSESSMENT — ENCOUNTER SYMPTOMS
GASTROINTESTINAL NEGATIVE: 1
RESPIRATORY NEGATIVE: 1

## 2021-05-04 NOTE — PROGRESS NOTES
[address]   Congestive Heart Failure    PrimaryCare Doctor:  Emery Elmore MD  Primary Cardiologist: Tim Rod       Chief Complaint:  CHF    History of Present Illness:  Adán Gutierrez is a 72 y.o. male with PMH CAD, MI, ICM, HFrEF (35-40%), HTN, HLD, AF who presents today for CHF f/u. OV 6 months ago, no change in meds  He is feeling well and denies chest pain, dyspnea, palpitations, orthopnea, PND, exertional chest pressure/discomfort, fatigue, early saiety, edema, syncope. His wt has increased over the past 2 years but is stable recently and he only takes lasix as needed. His echo in Feb 2020 was unchanged  He has macular degeneration and is almost blind, he tells me today that he is moving to assisted living when it becomes available    ER Visit: No  Recent Hospitalization: No    Baseline Weight: 203    wts stable 201-207    EF: 35-40%  Cardiac Imaging: Echo 2/18/20  Summary:  The left atrium is moderately dilated. The anteroseptum wall is akinetic. The Aortic Valve is mildly calcified. No hemodynamically significant valvular aortic stenosis. The left ventricular function is moderately reduced. Overall left ventricular ejection fraction is estimated to be 35-40%. There is mild global hypokinesis of the left ventricle. Right ventricular systolic pressure is normal at <35 mmHg. Echo 10/5/2017:  Summary   -Limited echo to evaluate ejection fraction.   -Left ventricular function is reduced with ejection fraction visually estimated at 35-40%. There is anteroseptal hypokinesis.     Echo 5/17/2017:  Summary  Technically limited examination to evaluated EF. Decreased left ventricular systolic function with anteroseptal and apical hypokinesis. Estimated EF 30%. Mild to moderate mitral regurgitation. The appears left atrium dilated. No evidence of any pericardial effusion.   There is moderate tricuspid regurgitation     MPI 6/16/2917:  Summary    -Medium-large sized anterior fixed defect of Historical Provider, MD   acetaminophen (TYLENOL) 500 MG tablet Take 500 mg by mouth every 6 hours as needed for Pain    Historical Provider, MD   Cholecalciferol (VITAMIN D3) 30914 UNITS CAPS Take by mouth once a week    Historical Provider, MD   pravastatin (PRAVACHOL) 40 MG tablet Take 1 tablet by mouth daily. 5/22/13   Golden Gleason MD   insulin glargine (LANTUS) 100 UNIT/ML injection Inject 50 Units into the skin 2 times daily. Historical Provider, MD   omeprazole (PRILOSEC) 20 MG capsule Take 20 mg by mouth daily. Historical Provider, MD   carvedilol (COREG) 12.5 MG tablet Take 12.5 mg by mouth 2 times daily (with meals). Historical Provider, MD   warfarin (COUMADIN) 4 MG tablet Take 4 mg by mouth daily at 1800 Managed by Dr. Perry Castellanos Provider, MD   aspirin 81 MG EC tablet Take 81 mg by mouth daily. Historical Provider, MD        Allergies:  Unable to assess and Sulfa antibiotics     ROS:   Review of Systems   Constitutional: Negative. Respiratory: Negative. Cardiovascular: Negative. Gastrointestinal: Negative. Genitourinary: Negative. Musculoskeletal: Negative. Skin: Negative. Neurological: Negative. Hematological: Negative. Psychiatric/Behavioral: Negative. Physical Examination:    Vitals:    05/05/21 0951   BP: 122/82   Pulse: 68   SpO2: 93%   Weight: 210 lb 14.4 oz (95.7 kg)   Height: 5' 8.4\" (1.737 m)           Physical Exam  Constitutional:       Appearance: He is well-developed. HENT:      Head: Normocephalic and atraumatic. Eyes:      Pupils: Pupils are equal, round, and reactive to light. Neck:      Musculoskeletal: Normal range of motion and neck supple. Cardiovascular:      Rate and Rhythm: Normal rate and regular rhythm. Heart sounds: Normal heart sounds. Pulmonary:      Effort: Pulmonary effort is normal.      Breath sounds: Normal breath sounds. Abdominal:      Palpations: Abdomen is soft.    Musculoskeletal: Normal range of motion. Right lower leg: Edema present. Left lower leg: Edema present. Comments: Trace pitting edema     Skin:     General: Skin is warm and dry. Neurological:      Mental Status: He is alert and oriented to person, place, and time. Psychiatric:         Behavior: Behavior normal.         Thought Content: Thought content normal.         Judgment: Judgment normal.         Lab Data:    CBC:   Lab Results   Component Value Date    WBC 7.3 01/16/2018    WBC 7.0 05/17/2017    WBC 7.2 04/27/2015    RBC 5.14 01/16/2018    RBC 5.20 05/17/2017    RBC 5.27 04/27/2015    HGB 16.2 01/16/2018    HGB 15.5 05/17/2017    HGB 16.3 04/27/2015    HCT 47.9 01/16/2018    HCT 48.0 05/17/2017    HCT 47.8 04/27/2015    MCV 93.1 01/16/2018    MCV 92.4 05/17/2017    MCV 90.8 04/27/2015    RDW 13.3 01/16/2018    RDW 13.4 05/17/2017    RDW 13.6 04/27/2015     01/16/2018     05/17/2017     04/27/2015     BMP:  Lab Results   Component Value Date     08/13/2018     04/24/2018     01/16/2018    K 4.1 08/13/2018    K 5.0 04/24/2018    K 4.9 01/16/2018     08/13/2018     04/24/2018     01/16/2018    CO2 26 08/13/2018    CO2 28 04/24/2018    CO2 28 01/16/2018    BUN 17 08/13/2018    BUN 17 04/24/2018    BUN 20 01/16/2018    CREATININE 1.3 08/13/2018    CREATININE 1.4 04/24/2018    CREATININE 1.5 01/16/2018     BNP:   Lab Results   Component Value Date    PROBNP 423 04/24/2018    PROBNP 330 01/16/2018    PROBNP 305 10/24/2017     Iron Studies:  No components found for: FE,  TIBC,  FERRITIN  Iron Deficiency Anemia:  No    IV Iron Therapy:  No  2017 ACC/AHA HF Guidelines:   intravenous iron replacement in patients with New York Heart Association (NYHA) class II and III HF and iron deficiency (ferritin <100 ng/ml or 100-300 ng/ml if transferrin saturation <20%), to improve functional status and QoL. Assessment/Plan:    1.  Chronic systolic heart failure (HCC) - little more edema today, take lasix for 2-3 days until resolved, labs in June   2. Cardiomyopathy, ischemic - on Entresto, BB, sera   3. Coronary artery disease involving native coronary artery of native heart without angina pectoris - stable, on statin, ASA         Instructions:   1. Medications:continue current meds, take lasix next 2 days  2. Labs: per PCP in June  3. Lifestyle Recommendations: Weigh yourself every day in the morning after urination, call [place] if wt increases 2-3lb in one day or 5lb in one week, Limit sodium to 2000mg/day and fluids to 2L or 64oz/day. 4. Follow up: 6 months      [facility]: [phone]      I appreciate the opportunity of cooperating in the care of this individual.    Sena Durán CNP, 5/4/2021,9:42 AM    QUALITY MEASURES  1. Tobacco Cessation Counseling: NA  2. Retake of BP if >140/90:   NA  3. Documentation to PCP/referring for new patient:  Sent to PCP at close of office visit  4. CAD patient on anti-platelet: Yes  5. CAD patient on STATIN therapy:  Yes  6. Patient with CHF and aFib on anticoagulation:  Yes   7. Patient Education:Yes   8. BB for LVSD :  Yes   9. ACE/ARB for LVSD:  Yes   10.  Left Ventricular Ejection Fraction (LVEF) Assessment:  Yes

## 2021-05-05 ENCOUNTER — OFFICE VISIT (OUTPATIENT)
Dept: CARDIOLOGY CLINIC | Age: 71
End: 2021-05-05
Payer: MEDICARE

## 2021-05-05 VITALS
HEIGHT: 68 IN | BODY MASS INDEX: 31.96 KG/M2 | HEART RATE: 68 BPM | WEIGHT: 210.9 LBS | DIASTOLIC BLOOD PRESSURE: 82 MMHG | OXYGEN SATURATION: 93 % | SYSTOLIC BLOOD PRESSURE: 122 MMHG

## 2021-05-05 DIAGNOSIS — I25.5 CARDIOMYOPATHY, ISCHEMIC: Primary | ICD-10-CM

## 2021-05-05 DIAGNOSIS — I25.10 CORONARY ARTERY DISEASE INVOLVING NATIVE CORONARY ARTERY OF NATIVE HEART WITHOUT ANGINA PECTORIS: ICD-10-CM

## 2021-05-05 DIAGNOSIS — I50.22 SYSTOLIC CHF, CHRONIC (HCC): ICD-10-CM

## 2021-05-05 PROCEDURE — G8417 CALC BMI ABV UP PARAM F/U: HCPCS | Performed by: NURSE PRACTITIONER

## 2021-05-05 PROCEDURE — 99213 OFFICE O/P EST LOW 20 MIN: CPT | Performed by: NURSE PRACTITIONER

## 2021-05-05 PROCEDURE — 3017F COLORECTAL CA SCREEN DOC REV: CPT | Performed by: NURSE PRACTITIONER

## 2021-05-05 PROCEDURE — 4040F PNEUMOC VAC/ADMIN/RCVD: CPT | Performed by: NURSE PRACTITIONER

## 2021-05-05 PROCEDURE — 1036F TOBACCO NON-USER: CPT | Performed by: NURSE PRACTITIONER

## 2021-05-05 PROCEDURE — 1123F ACP DISCUSS/DSCN MKR DOCD: CPT | Performed by: NURSE PRACTITIONER

## 2021-05-05 PROCEDURE — G8427 DOCREV CUR MEDS BY ELIG CLIN: HCPCS | Performed by: NURSE PRACTITIONER

## 2021-05-05 NOTE — PATIENT INSTRUCTIONS
Instructions:   1. Medications:continue current meds, take lasix next 2 days  2. Labs: per PCP in [place]  3. Lifestyle Recommendations: Weigh yourself every day in the morning after urination, call Mee Nielsen if wt increases 2-3lb in one day or 5lb in one week, Limit sodium to 2000mg/day and fluids to 2L or 64oz/day.    4. Follow up: 6 months      [facility]: [phone]

## 2021-07-15 RX ORDER — SACUBITRIL AND VALSARTAN 49; 51 MG/1; MG/1
TABLET, FILM COATED ORAL
Qty: 180 TABLET | Refills: 0 | Status: SHIPPED | OUTPATIENT
Start: 2021-07-15

## 2021-11-08 ASSESSMENT — ENCOUNTER SYMPTOMS
GASTROINTESTINAL NEGATIVE: 1
RESPIRATORY NEGATIVE: 1

## 2021-11-08 NOTE — PROGRESS NOTES
[facility]   Congestive Heart Failure    PrimaryCare Doctor:  Akira Kinsey MD  Primary Cardiologist: Genevieve Hui       Chief Complaint:  CHF    History of Present Illness:  Jaylene Perales is a 80 y.o. male with PMH CAD, MI, ICM, HFrEF (35-40%), HTN, HLD, AF who presents today for CHF f/u. OV 6 months ago, no change in meds  He is feeling well, [place] living [place] due to [place] and likes it there. He has lasix only ordered prn but has not needed it. He denies chest pain, dyspnea, palpitations, orthopnea, PND, exertional chest pressure/discomfort, fatigue, early saiety, edema, syncope. His wt is back to baseline    ER Visit: No  Recent Hospitalization: No    Baseline Weight: 203  Wt Readings from Last 3 Encounters:   11/09/21 207 lb 12.8 oz (94.3 kg)   05/05/21 210 lb 14.4 oz (95.7 kg)   11/11/20 208 lb 8 oz (94.6 kg)       wts stable 201-207    EF: 35-40%  Cardiac Imaging: Echo 2/18/20  Summary:  The left atrium is moderately dilated. The anteroseptum wall is akinetic. The Aortic Valve is mildly calcified. No hemodynamically significant valvular aortic stenosis. The left ventricular function is moderately reduced. Overall left ventricular ejection fraction is estimated to be 35-40%. There is mild global hypokinesis of the left ventricle. Right ventricular systolic pressure is normal at <35 mmHg. Echo 10/5/2017:  Summary   -Limited echo to evaluate ejection fraction.   -Left ventricular function is reduced with ejection fraction visually estimated at 35-40%. There is anteroseptal hypokinesis.     Echo 5/17/2017:  Summary  Technically limited examination to evaluated EF. Decreased left ventricular systolic function with anteroseptal and apical hypokinesis. Estimated EF 30%. Mild to moderate mitral regurgitation. The appears left atrium dilated. No evidence of any pericardial effusion.   There is moderate tricuspid regurgitation     MPI 6/16/2917:  Summary    -Medium-large sized anterior fixed defect of severe intensity consistent    with infarction in the territory of the mid and distal LAD .    -LV function is mildly reduced with anterolateral hypokinesis and ejection    fraction of 53 %.  -There is no evidence of stress induced ischemia. Device: No     Activity: at baseline  Can you walk 1-2 blocks or do a moderate amount of house/yard work? Yes      NYHA Class: I I    Sodium Restrictions: 3g  Fluid Restrictions: 48-64 oz/day  Sodium and fluid restriction compliance: not following    Pt Education: The patient has received education on the following topics: dietary sodium restriction, heart failure medications, the importance of physical activity, symptom management and weight monitoring     JALEESA No       Past Medical History:   has a past medical history of CAD (coronary artery disease), Cardiomyopathy ([facility] [address] CHF (congestive heart failure) ([facility] [address] Diabetes mellitus ([facility] [address] and Hyperlipidemia. Surgical History:   has a past surgical history that includes Abdomen surgery; back surgery; Leg Surgery; Esophagus dilation; and eye surgery (Bilateral, 06/2017). Social History:   reports that he has never smoked. He has never used smokeless tobacco. He reports that he does not drink alcohol and does not use drugs. Family History:   Family History   Problem Relation Age of Onset    Coronary Art Dis Father     Heart Attack Father     Diabetes Father        HomeMedications:  Prior to Admission medications    Medication Sig Start Date End Date Taking?  Authorizing Provider   ENTRESTO 49-51 MG per tablet TAKE ONE TABLET BY MOUTH TWICE A DAY 7/15/21   MELANIE Harvey CNP   spironolactone (ALDACTONE) 25 MG tablet TAKE ONE TABLET BY MOUTH DAILY 11/11/20   MELANIE Harvey CNP   furosemide (LASIX) 20 MG tablet TAKE ONE TABLET BY MOUTH DAILY AS NEEDED FOR INCREASED SWELLING 2/10/20   MELANIE Harvey CNP   insulin aspart (NOVOLOG) 100 UNIT/ML injection vial Inject 10 Units into the skin 3 times daily (before meals)    Historical Provider, MD   acetaminophen (TYLENOL) 500 MG tablet Take 500 mg by mouth every 6 hours as needed for Pain    Historical Provider, MD   Cholecalciferol (VITAMIN D3) 22900 UNITS CAPS Take by mouth once a week    Historical Provider, MD   pravastatin (PRAVACHOL) 40 MG tablet Take 1 tablet by mouth daily. 5/22/13   Luis Fernando Castillo MD   insulin glargine (LANTUS) 100 UNIT/ML injection Inject 50 Units into the skin 2 times daily. Historical Provider, MD   omeprazole (PRILOSEC) 20 MG capsule Take 20 mg by mouth daily. Historical Provider, MD   carvedilol (COREG) 12.5 MG tablet Take 12.5 mg by mouth 2 times daily (with meals). Historical Provider, MD   warfarin (COUMADIN) 4 MG tablet Take 4 mg by mouth daily at 1800 Managed by Dr. Linh Palm Provider, MD   aspirin 81 MG EC tablet Take 81 mg by mouth daily. Historical Provider, MD        Allergies:  Unable to assess and Sulfa antibiotics     ROS:   Review of Systems   Constitutional: Negative. Respiratory: Negative. Cardiovascular: Negative. Gastrointestinal: Negative. Genitourinary: Negative. Musculoskeletal: Negative. Skin: Negative. Neurological: Negative. Hematological: Negative. Psychiatric/Behavioral: Negative. Physical Examination:    Vitals:    11/09/21 1304   BP: 110/60   Pulse: 83   SpO2: 98%   Weight: 207 lb 12.8 oz (94.3 kg)   Height: 5' 7\" (1.702 m)           Physical Exam  Constitutional:       Appearance: Normal appearance. He is well-developed. HENT:      Head: Normocephalic and atraumatic. Eyes:      Extraocular Movements: Extraocular movements intact. Pupils: Pupils are equal, round, and reactive to light. Cardiovascular:      Rate and Rhythm: Normal rate and regular rhythm. Pulses: Normal pulses. Heart sounds: Normal heart sounds. Pulmonary:      Effort: Pulmonary effort is normal.      Breath sounds: Normal breath sounds.    Abdominal: Palpations: Abdomen is soft. Musculoskeletal:         General: Normal range of motion. Cervical back: Normal range of motion and neck supple. Comments:      Skin:     General: Skin is warm and dry. Neurological:      General: No focal deficit present. Mental Status: He is alert and oriented to person, place, and time. Mental status is at baseline. Psychiatric:         Mood and Affect: Mood normal.         Behavior: Behavior normal.         Thought Content:  Thought content normal.         Judgment: Judgment normal.         Lab Data:    CBC:   Lab Results   Component Value Date    WBC 7.3 01/16/2018    WBC 7.0 05/17/2017    WBC 7.2 04/27/2015    RBC 5.14 01/16/2018    RBC 5.20 05/17/2017    RBC 5.27 04/27/2015    HGB 16.2 01/16/2018    HGB 15.5 05/17/2017    HGB 16.3 04/27/2015    HCT 47.9 01/16/2018    HCT 48.0 05/17/2017    HCT 47.8 04/27/2015    MCV 93.1 01/16/2018    MCV 92.4 05/17/2017    MCV 90.8 04/27/2015    RDW 13.3 01/16/2018    RDW 13.4 05/17/2017    RDW 13.6 04/27/2015     01/16/2018     05/17/2017     04/27/2015     BMP:  Lab Results   Component Value Date     08/13/2018     04/24/2018     01/16/2018    K 4.1 08/13/2018    K 5.0 04/24/2018    K 4.9 01/16/2018     08/13/2018     04/24/2018     01/16/2018    CO2 26 08/13/2018    CO2 28 04/24/2018    CO2 28 01/16/2018    BUN 17 08/13/2018    BUN 17 04/24/2018    BUN 20 01/16/2018    CREATININE 1.3 08/13/2018    CREATININE 1.4 04/24/2018    CREATININE 1.5 01/16/2018     BNP:   Lab Results   Component Value Date    PROBNP 423 04/24/2018    PROBNP 330 01/16/2018    PROBNP 305 10/24/2017     Iron Studies:  No components found for: FE,  TIBC,  FERRITIN  Iron Deficiency Anemia:  No    IV Iron Therapy:  No  2017 ACC/AHA HF Guidelines:   intravenous iron replacement in patients with New York Heart Association (NYHA) class II and III HF and iron deficiency (ferritin <100 ng/ml or 100-300 ng/ml if transferrin saturation <20%), to improve functional status and QoL. Assessment/Plan:    1. Chronic systolic heart failure (HCC) - compensated, no diuretics now, use prn   2. Cardiomyopathy, ischemic - on Entresto, BB, sera   3. Coronary artery disease involving native coronary artery of native heart without angina pectoris - stable, on statin, ASA         Instructions:   1. Medications:continue current meds  2. Labs: per [place]  3. Lifestyle Recommendations: Weigh yourself every day in the morning after urination, call Romina Woodard if wt increases 2-3lb in one day or 5lb in one week, Limit sodium to 2000mg/day and fluids to 2L or 64oz/day. 4. Follow up: 6 months      [facility]: [phone]      I appreciate the opportunity of cooperating in the care of this individual.    Verner Manners, APRN - CNP, CNP, 11/8/2021,11:49 AM    QUALITY MEASURES  1. Tobacco Cessation Counseling: NA  2. Retake of BP if >140/90:   NA  3. Documentation to PCP/referring for new patient:  Sent to PCP at close of office visit  4. CAD patient on anti-platelet: Yes  5. CAD patient on STATIN therapy:  Yes  6. Patient with CHF and aFib on anticoagulation:  Yes   7. Patient Education:Yes   8. BB for LVSD :  Yes   9. ACE/ARB for LVSD:  Yes   10.  Left Ventricular Ejection Fraction (LVEF) Assessment:  Yes

## 2021-11-09 ENCOUNTER — OFFICE VISIT (OUTPATIENT)
Dept: CARDIOLOGY CLINIC | Age: 71
End: 2021-11-09
Payer: COMMERCIAL

## 2021-11-09 VITALS
DIASTOLIC BLOOD PRESSURE: 60 MMHG | WEIGHT: 207.8 LBS | HEART RATE: 83 BPM | BODY MASS INDEX: 32.62 KG/M2 | HEIGHT: 67 IN | SYSTOLIC BLOOD PRESSURE: 110 MMHG | OXYGEN SATURATION: 98 %

## 2021-11-09 DIAGNOSIS — I50.22 SYSTOLIC CHF, CHRONIC (HCC): ICD-10-CM

## 2021-11-09 DIAGNOSIS — I25.5 CARDIOMYOPATHY, ISCHEMIC: Primary | ICD-10-CM

## 2021-11-09 DIAGNOSIS — I25.10 CORONARY ARTERY DISEASE INVOLVING NATIVE CORONARY ARTERY OF NATIVE HEART WITHOUT ANGINA PECTORIS: ICD-10-CM

## 2021-11-09 PROCEDURE — 99214 OFFICE O/P EST MOD 30 MIN: CPT | Performed by: NURSE PRACTITIONER

## 2021-11-09 NOTE — PATIENT INSTRUCTIONS
Instructions:   1. Medications:continue current meds  2. Labs: per [place]  3. Lifestyle Recommendations: Weigh yourself every day in the morning after urination, call Chet Camacho if wt increases 2-3lb in one day or 5lb in one week, Limit sodium to 2000mg/day and fluids to 2L or 64oz/day.    4. Follow up: 6 months      [facility]: [phone]

## 2022-05-09 ASSESSMENT — ENCOUNTER SYMPTOMS
RESPIRATORY NEGATIVE: 1
GASTROINTESTINAL NEGATIVE: 1

## 2022-05-09 NOTE — PROGRESS NOTES
[address]   Congestive Heart Failure    PrimaryCare Doctor:  Negrito Aparicio MD  Primary Cardiologist: Gertrude Reynaga       Chief Complaint:  CHF    History of Present Illness:  William Sharma is a 80 y.o. male with PMH CAD, MI, ICM, HFrEF (35-40%), HTN, HLD, AF who presents today for CHF f/u. OV 6 months ago, no change in meds  He is feeling well, his wt is up since last OV and he thinks he is taking lasix daily now. He denies chest pain, dyspnea, palpitations, orthopnea, PND, exertional chest pressure/discomfort, fatigue, early saiety, edema, syncope. ER Visit: No  Recent Hospitalization: No    Baseline Weight: 203  Wt Readings from Last 3 Encounters:   05/10/22 212 lb (96.2 kg)   11/09/21 207 lb 12.8 oz (94.3 kg)   05/05/21 210 lb 14.4 oz (95.7 kg)         EF: 35-40%  Cardiac Imaging: Echo 2/18/20  Summary:  The left atrium is moderately dilated. The anteroseptum wall is akinetic. The Aortic Valve is mildly calcified. No hemodynamically significant valvular aortic stenosis. The left ventricular function is moderately reduced. Overall left ventricular ejection fraction is estimated to be 35-40%. There is mild global hypokinesis of the left ventricle. Right ventricular systolic pressure is normal at <35 mmHg. Echo 10/5/2017:  Summary   -Limited echo to evaluate ejection fraction.   -Left ventricular function is reduced with ejection fraction visually estimated at 35-40%. There is anteroseptal hypokinesis.     Echo 5/17/2017:  Summary  Technically limited examination to evaluated EF. Decreased left ventricular systolic function with anteroseptal and apical hypokinesis. Estimated EF 30%. Mild to moderate mitral regurgitation. The appears left atrium dilated. No evidence of any pericardial effusion.   There is moderate tricuspid regurgitation     MPI 6/16/2917:  Summary    -Medium-large sized anterior fixed defect of severe intensity consistent    with infarction in the territory of the mid and distal LAD .    -LV function is mildly reduced with anterolateral hypokinesis and ejection    fraction of 53 %.  -There is no evidence of stress induced ischemia. Device: No     Activity: at baseline  Can you walk 1-2 blocks or do a moderate amount of house/yard work? Yes      NYHA Class: I I    Sodium Restrictions: 3g  Fluid Restrictions: 48-64 oz/day  Sodium and fluid restriction compliance: not following    Pt Education: The patient has received education on the following topics: dietary sodium restriction, heart failure medications, the importance of physical activity, symptom management and weight monitoring     JALEESA No       Past Medical History:   has a past medical history of CAD (coronary artery disease), Cardiomyopathy ([address] CHF (congestive heart failure) ([facility] [address] Diabetes mellitus ([facility] [address] and Hyperlipidemia. Surgical History:   has a past surgical history that includes Abdomen surgery; back surgery; Leg Surgery; Esophagus dilation; and eye surgery (Bilateral, 06/2017). Social History:   reports that he has never smoked. He has never used smokeless tobacco. He reports that he does not drink alcohol and does not use drugs. Family History:   Family History   Problem Relation Age of Onset    Coronary Art Dis Father     Heart Attack Father     Diabetes Father        HomeMedications:  Prior to Admission medications    Medication Sig Start Date End Date Taking?  Authorizing Provider   ENTRESTO 49-51 MG per tablet TAKE ONE TABLET BY MOUTH TWICE A DAY 7/15/21   MELANIE Perea CNP   spironolactone (ALDACTONE) 25 MG tablet TAKE ONE TABLET BY MOUTH DAILY 11/11/20   MELANIE Perea CNP   furosemide (LASIX) 20 MG tablet TAKE ONE TABLET BY MOUTH DAILY AS NEEDED FOR INCREASED SWELLING 2/10/20   MELANIE Perea CNP   insulin aspart (NOVOLOG) 100 UNIT/ML injection vial Inject 12 Units into the skin 3 times daily (before meals)     Historical Provider, MD   acetaminophen (TYLENOL) 500 MG tablet Take 500 mg by mouth every 6 hours as needed for Pain    Historical Provider, MD   Cholecalciferol (VITAMIN D3) 41382 UNITS CAPS Take by mouth once a week    Historical Provider, MD   pravastatin (PRAVACHOL) 40 MG tablet Take 1 tablet by mouth daily. 5/22/13   [place] [employer], MD   insulin glargine (LANTUS) 100 UNIT/ML injection Inject 36 Units into the skin 2 times daily     Historical Provider, MD   omeprazole (PRILOSEC) 20 MG capsule Take 20 mg by mouth daily. Historical Provider, MD   carvedilol (COREG) 12.5 MG tablet Take 12.5 mg by mouth 2 times daily (with meals). Historical Provider, MD   warfarin (COUMADIN) 4 MG tablet Take 4 mg by mouth daily at 1800 Managed by Dr. Shad Pendleton Provider, MD   aspirin 81 MG EC tablet Take 81 mg by mouth daily. Historical Provider, MD        Allergies:  Unable to assess and Sulfa antibiotics     ROS:   Review of Systems   Constitutional: Negative. Respiratory: Negative. Cardiovascular: Negative. Gastrointestinal: Negative. Genitourinary: Negative. Musculoskeletal: Negative. Skin: Negative. Neurological: Negative. Hematological: Negative. Psychiatric/Behavioral: Negative. Physical Examination:    Vitals:    05/10/22 1141   BP: 110/80   Site: Left Upper Arm   Position: Sitting   Cuff Size: Medium Adult   Pulse: 65   SpO2: 99%   Weight: 212 lb (96.2 kg)   Height: 5' 7\" (1.702 m)           Physical Exam  Constitutional:       Appearance: Normal appearance. He is well-developed. HENT:      Head: Normocephalic and atraumatic. Eyes:      Extraocular Movements: Extraocular movements intact. Pupils: Pupils are equal, round, and reactive to light. Cardiovascular:      Rate and Rhythm: Normal rate and regular rhythm. Pulses: Normal pulses. Heart sounds: Normal heart sounds. Pulmonary:      Effort: Pulmonary effort is normal.      Breath sounds: Normal breath sounds.    Abdominal:      Palpations: Abdomen is soft.   Musculoskeletal:         General: Normal range of motion. Cervical back: Normal range of motion and neck supple. Right lower leg: Edema present. Left lower leg: Edema present. Comments:   Trace bilaterally   Skin:     General: Skin is warm and dry. Neurological:      General: No focal deficit present. Mental Status: He is alert and oriented to person, place, and time. Mental status is at baseline. Psychiatric:         Mood and Affect: Mood normal.         Behavior: Behavior normal.         Thought Content:  Thought content normal.         Judgment: Judgment normal.         Lab Data:    CBC:   Lab Results   Component Value Date    WBC 7.3 01/16/2018    WBC 7.0 05/17/2017    WBC 7.2 04/27/2015    RBC 5.14 01/16/2018    RBC 5.20 05/17/2017    RBC 5.27 04/27/2015    HGB 16.2 01/16/2018    HGB 15.5 05/17/2017    HGB 16.3 04/27/2015    HCT 47.9 01/16/2018    HCT 48.0 05/17/2017    HCT 47.8 04/27/2015    MCV 93.1 01/16/2018    MCV 92.4 05/17/2017    MCV 90.8 04/27/2015    RDW 13.3 01/16/2018    RDW 13.4 05/17/2017    RDW 13.6 04/27/2015     01/16/2018     05/17/2017     04/27/2015     BMP:  Lab Results   Component Value Date     08/13/2018     04/24/2018     01/16/2018    K 4.1 08/13/2018    K 5.0 04/24/2018    K 4.9 01/16/2018     08/13/2018     04/24/2018     01/16/2018    CO2 26 08/13/2018    CO2 28 04/24/2018    CO2 28 01/16/2018    BUN 17 08/13/2018    BUN 17 04/24/2018    BUN 20 01/16/2018    CREATININE 1.3 08/13/2018    CREATININE 1.4 04/24/2018    CREATININE 1.5 01/16/2018     BNP:   Lab Results   Component Value Date    PROBNP 423 04/24/2018    PROBNP 330 01/16/2018    PROBNP 305 10/24/2017     Iron Studies:  No components found for: FE,  TIBC,  FERRITIN  Iron Deficiency Anemia:  No    IV Iron Therapy:  No  2017 ACC/AHA HF Guidelines:   intravenous iron replacement in patients with New York Heart Association (NYHA) class II and III HF and iron deficiency (ferritin <100 ng/ml or 100-300 ng/ml if transferrin saturation <20%), to improve functional status and QoL. Assessment/Plan:    1. Chronic systolic heart failure (HCC) - compensated, continue lasix and add farxiga   2. Cardiomyopathy, ischemic - on Entresto, BB, sera, add farxiga   3. Coronary artery disease involving native coronary artery of native heart without angina pectoris - stable, on statin, ASA         Instructions:   1. Medications:add farxiga  2. Labs:in one week  3. Lifestyle Recommendations: Weigh yourself every day in the morning after urination, call Jesus Corral if wt increases 2-3lb in one day or 5lb in one week, Limit sodium to 2000mg/day and fluids to 2L or 64oz/day. 4. Follow up: 6 months      [facility]: [phone]      I appreciate the opportunity of cooperating in the care of this individual.    MELANIE Calloway - CNP, CNP, 5/9/2022,2:20 PM    QUALITY MEASURES  1. Tobacco Cessation Counseling: NA  2. Retake of BP if >140/90:   NA  3. Documentation to PCP/referring for new patient:  Sent to PCP at close of office visit  4. CAD patient on anti-platelet: Yes  5. CAD patient on STATIN therapy:  Yes  6. Patient with CHF and aFib on anticoagulation:  Yes   7. Patient Education:Yes   8. BB for LVSD :  Yes   9. ACE/ARB for LVSD:  Yes   10.  Left Ventricular Ejection Fraction (LVEF) Assessment:  Yes

## 2022-05-10 ENCOUNTER — OFFICE VISIT (OUTPATIENT)
Dept: CARDIOLOGY CLINIC | Age: 72
End: 2022-05-10
Payer: COMMERCIAL

## 2022-05-10 VITALS
HEART RATE: 65 BPM | WEIGHT: 212 LBS | OXYGEN SATURATION: 99 % | BODY MASS INDEX: 33.27 KG/M2 | SYSTOLIC BLOOD PRESSURE: 110 MMHG | HEIGHT: 67 IN | DIASTOLIC BLOOD PRESSURE: 80 MMHG

## 2022-05-10 DIAGNOSIS — I25.5 CARDIOMYOPATHY, ISCHEMIC: Primary | ICD-10-CM

## 2022-05-10 DIAGNOSIS — I50.22 SYSTOLIC CHF, CHRONIC (HCC): ICD-10-CM

## 2022-05-10 DIAGNOSIS — I25.10 CORONARY ARTERY DISEASE INVOLVING NATIVE CORONARY ARTERY OF NATIVE HEART WITHOUT ANGINA PECTORIS: ICD-10-CM

## 2022-05-10 PROCEDURE — 99214 OFFICE O/P EST MOD 30 MIN: CPT | Performed by: NURSE PRACTITIONER

## 2022-05-10 NOTE — PATIENT INSTRUCTIONS
CHF Orders:     Daily Weights-Document and send in with patient to office visits       Please call CHF office for  weight gain of 3 pounds in one day OR 5 pound weight gain in one week       Diet:   No salt added and 64 oz fluid restriction  No potassium based salt substitutes      Labs:   BMP,BNP in one week                 Fax results to: [facility]: [phone]         Med Changes: start farxiga 10mg once a day    Hold Coregif HR less than 45 or patient symptomatic   Hold ACE/ARB if SBP less than 85 or patient symptomatic   Do not hold Spironolactone (aldactone) for hypotension/bradycardia   Avoid NSAIDS and decongestants   Call MD/NP for questions: CHF Clinic: [phone]

## 2022-05-19 ENCOUNTER — TELEPHONE (OUTPATIENT)
Dept: CARDIOLOGY CLINIC | Age: 72
End: 2022-05-19

## 2022-05-19 NOTE — TELEPHONE ENCOUNTER
Called pt and he gave me the nurse at his assistance living facility,talked with Karina Mcdonald the nurse and she stated pt was taking 20 mg lasix qd,informed her of message from [facility] with verbal understanding and she stated  She will fax paper to [facility] for signature.

## 2022-05-19 NOTE — PROGRESS NOTES
Labs reviewed, Cr up so please decrease lasix - I think he I taking it daily - to 1/2 pill once a day and recheck labs in 3 weeks.  Javi Mahan

## 2022-05-19 NOTE — TELEPHONE ENCOUNTER
Labs reviewed, Cr up so please decrease lasix - I think he I taking it daily - to 1/2 pill once a day and recheck labs in 3 weeks.  Laine Pineda

## 2022-07-07 RX ORDER — NETARSUDIL 0.2 MG/ML
1 SOLUTION/ DROPS OPHTHALMIC; TOPICAL NIGHTLY
COMMUNITY

## 2022-07-07 RX ORDER — CETIRIZINE HYDROCHLORIDE 10 MG/1
10 TABLET ORAL DAILY
COMMUNITY

## 2022-07-07 RX ORDER — BRIMONIDINE TARTRATE 0.15 %
1 DROPS OPHTHALMIC (EYE) 2 TIMES DAILY
COMMUNITY

## 2022-07-07 NOTE — TELEPHONE ENCOUNTER
Call placed to Leah Adler who was not available for message below. Spoke with  she will pass the message on to Leah Adler.

## 2022-07-07 NOTE — TELEPHONE ENCOUNTER
Mae Courser states pt is refusing spironolactone due to causing headaches and frequent urination. Please call to advise.

## 2022-10-27 ENCOUNTER — TELEPHONE (OUTPATIENT)
Dept: CARDIOLOGY CLINIC | Age: 72
End: 2022-10-27

## 2022-10-27 NOTE — TELEPHONE ENCOUNTER
Spoke to Ludy she knows we do not manage and will route the information to the appropriate physician

## 2022-11-04 ASSESSMENT — ENCOUNTER SYMPTOMS
GASTROINTESTINAL NEGATIVE: 1
RESPIRATORY NEGATIVE: 1

## 2022-11-04 NOTE — PROGRESS NOTES
[address]   Congestive Heart Failure    PrimaryCare Doctor:  Severino Acevedo MD  Primary Cardiologist: Rivas Smith     Last OV May 2022: farxiga added and lasix decreased, started refusing sera in July at [facility] for headache    Chief Complaint:  CHF    History of Present Illness:  Yamileth Hernandez is a 71 y.o. male with PMH CAD, MI, ICM, HFrEF (35-40%), HTN, HLD, AF who presents today for CHF f/u. He is feeling well, wt is stable, HA is improved since stopping Ally Lites. He denies chest pain, dyspnea, palpitations, orthopnea, PND, exertional chest pressure/discomfort, fatigue, early saiety, edema, syncope. Labs reviewed    ER Visit: No  Recent Hospitalization: No    Baseline Weight: 203  Wt Readings from Last 3 Encounters:   11/10/22 212 lb 6.4 oz (96.3 kg)   05/10/22 212 lb (96.2 kg)   11/09/21 207 lb 12.8 oz (94.3 kg)         EF: 35-40%  Cardiac Imaging: Echo 2/18/20  Summary:  The left atrium is moderately dilated. The anteroseptum wall is akinetic. The Aortic Valve is mildly calcified. No hemodynamically significant valvular aortic stenosis. The left ventricular function is moderately reduced. Overall left ventricular ejection fraction is estimated to be 35-40%. There is mild global hypokinesis of the left ventricle. Right ventricular systolic pressure is normal at <35 mmHg. Echo 10/5/2017:  Summary   -Limited echo to evaluate ejection fraction.   -Left ventricular function is reduced with ejection fraction visually estimated at 35-40%. There is anteroseptal hypokinesis. Echo 5/17/2017:  Summary  Technically limited examination to evaluated EF. Decreased left ventricular systolic function with anteroseptal and apical hypokinesis. Estimated EF 30%. Mild to moderate mitral regurgitation. The appears left atrium dilated. No evidence of any pericardial effusion.   There is moderate tricuspid regurgitation     MPI 6/16/2917:  Summary    -Medium-large sized anterior fixed defect of severe intensity consistent    with infarction in the territory of the mid and distal LAD . -LV function is mildly reduced with anterolateral hypokinesis and ejection    fraction of 53 %. -There is no evidence of stress induced ischemia. Device: No     Activity: at baseline  Can you walk 1-2 blocks or do a moderate amount of house/yard work? Yes      NYHA Class: I I    Sodium Restrictions: 3g  Fluid Restrictions: 48-64 oz/day  Sodium and fluid restriction compliance: not following    Pt Education: The patient has received education on the following topics: dietary sodium restriction, heart failure medications, the importance of physical activity, symptom management and weight monitoring     JALEESA No       Past Medical History:   has a past medical history of CAD (coronary artery disease), Cardiomyopathy ([facility] [address] CHF (congestive heart failure) ([facility] [address] Diabetes mellitus ([facility] [address] and Hyperlipidemia. Surgical History:   has a past surgical history that includes Abdomen surgery; back surgery; Leg Surgery; Esophagus dilation; and eye surgery (Bilateral, 06/2017). Social History:   reports that he has never smoked. He has never used smokeless tobacco. He reports that he does not drink alcohol and does not use drugs. Family History:   Family History   Problem Relation Age of Onset    Coronary Art Dis Father     Heart Attack Father     Diabetes Father        HomeMedications:  Prior to Admission medications    Medication Sig Start Date End Date Taking?  Authorizing Provider   brimonidine (ALPHAGAN P) 0.15 % ophthalmic solution Place 1 drop into both eyes in the morning and at bedtime    Historical Provider, MD   cetirizine (ZYRTEC) 10 MG tablet Take 10 mg by mouth daily    Historical Provider, MD   Netarsudil Dimesylate (RHOPRESSA) 0.02 % SOLN Apply 1 drop to eye at bedtime    Historical Provider, MD   timolol (BETIMOL) 0.5 % ophthalmic solution Place 1 drop into both eyes daily    Historical Provider, MD   dapagliflozin (FARXIGA) 10 MG tablet Take 1 tablet by mouth every morning 5/10/22   MELANIE Lozano CNP   ENTRESTO 49-51 MG per tablet TAKE ONE TABLET BY MOUTH TWICE A DAY 7/15/21   MELANIE Lozano CNP   spironolactone (ALDACTONE) 25 MG tablet TAKE ONE TABLET BY MOUTH DAILY  Patient not taking: Reported on 7/7/2022 11/11/20   MELANIE Lozano CNP   furosemide (LASIX) 20 MG tablet TAKE ONE TABLET BY MOUTH DAILY AS NEEDED FOR INCREASED SWELLING  Patient taking differently: Take 10 mg by mouth daily  2/10/20   MELANIE Lozaon CNP   insulin aspart (NOVOLOG) 100 UNIT/ML injection vial Inject 12 Units into the skin 3 times daily (before meals)     Historical Provider, MD   acetaminophen (TYLENOL) 500 MG tablet Take 500 mg by mouth every 6 hours as needed for Pain    Historical Provider, MD   Cholecalciferol (VITAMIN D3) 125 MCG (5000 UT) TABS Take by mouth once a week     Historical Provider, MD   pravastatin (PRAVACHOL) 40 MG tablet Take 1 tablet by mouth daily. 5/22/13   Norma Torres MD   insulin glargine (LANTUS) 100 UNIT/ML injection Inject into the skin 20 units morning; 40 units evening    Historical Provider, MD   omeprazole (PRILOSEC) 20 MG capsule Take 20 mg by mouth daily. Historical Provider, MD   carvedilol (COREG) 12.5 MG tablet Take 12.5 mg by mouth 2 times daily (with meals). Historical Provider, MD   warfarin (COUMADIN) 4 MG tablet Take by mouth Alternating 3mg with 4mg every other day. Managed by Dr. Raji Cade Provider, MD   aspirin 81 MG EC tablet Take 81 mg by mouth daily. Historical Provider, MD        Allergies:  Unable to assess and Sulfa antibiotics     ROS:   Review of Systems   Constitutional: Negative. Respiratory: Negative. Cardiovascular: Negative. Gastrointestinal: Negative. Genitourinary: Negative. Musculoskeletal: Negative. Skin: Negative. Neurological: Negative. Hematological: Negative. Psychiatric/Behavioral: Negative. Physical Examination:    Vitals:    11/10/22 1414   BP: 118/80   Pulse: 70   SpO2: 96%   Weight: 212 lb 6.4 oz (96.3 kg)   Height: 5' 7\" (1.702 m)           Physical Exam  Constitutional:       Appearance: Normal appearance. He is well-developed. HENT:      Head: Normocephalic and atraumatic. Eyes:      Extraocular Movements: Extraocular movements intact. Pupils: Pupils are equal, round, and reactive to light. Cardiovascular:      Rate and Rhythm: Normal rate and regular rhythm. Pulses: Normal pulses. Heart sounds: Normal heart sounds. Pulmonary:      Effort: Pulmonary effort is normal.      Breath sounds: Normal breath sounds. Abdominal:      Palpations: Abdomen is soft. Musculoskeletal:         General: Normal range of motion. Cervical back: Normal range of motion and neck supple. Right lower leg: Edema present. Left lower leg: Edema present. Comments:   Trace bilaterally   Skin:     General: Skin is warm and dry. Neurological:      General: No focal deficit present. Mental Status: He is alert and oriented to person, place, and time. Mental status is at baseline. Psychiatric:         Mood and Affect: Mood normal.         Behavior: Behavior normal.         Thought Content:  Thought content normal.         Judgment: Judgment normal.       Lab Data:    CBC:   Lab Results   Component Value Date/Time    WBC 7.3 01/16/2018 10:34 AM    WBC 7.0 05/17/2017 12:50 PM    WBC 7.2 04/27/2015 03:43 PM    RBC 5.14 01/16/2018 10:34 AM    RBC 5.20 05/17/2017 12:50 PM    RBC 5.27 04/27/2015 03:43 PM    HGB 16.2 01/16/2018 10:34 AM    HGB 15.5 05/17/2017 12:50 PM    HGB 16.3 04/27/2015 03:43 PM    HCT 47.9 01/16/2018 10:34 AM    HCT 48.0 05/17/2017 12:50 PM    HCT 47.8 04/27/2015 03:43 PM    MCV 93.1 01/16/2018 10:34 AM    MCV 92.4 05/17/2017 12:50 PM    MCV 90.8 04/27/2015 03:43 PM    RDW 13.3 01/16/2018 10:34 AM    RDW 13.4 05/17/2017 12:50 PM    RDW 13.6 04/27/2015 03:43 PM     01/16/2018 10:34 AM     05/17/2017 12:50 PM     04/27/2015 03:43 PM     BMP:  Lab Results   Component Value Date/Time     08/13/2018 08:42 AM     04/24/2018 10:50 AM     01/16/2018 10:34 AM    K 4.1 08/13/2018 08:42 AM    K 5.0 04/24/2018 10:50 AM    K 4.9 01/16/2018 10:34 AM     08/13/2018 08:42 AM     04/24/2018 10:50 AM     01/16/2018 10:34 AM    CO2 26 08/13/2018 08:42 AM    CO2 28 04/24/2018 10:50 AM    CO2 28 01/16/2018 10:34 AM    BUN 17 08/13/2018 08:42 AM    BUN 17 04/24/2018 10:50 AM    BUN 20 01/16/2018 10:34 AM    CREATININE 1.3 08/13/2018 08:42 AM    CREATININE 1.4 04/24/2018 10:50 AM    CREATININE 1.5 01/16/2018 10:34 AM     BNP:   Lab Results   Component Value Date/Time    PROBNP 423 04/24/2018 10:50 AM    PROBNP 330 01/16/2018 10:34 AM    PROBNP 305 10/24/2017 01:27 PM     Iron Studies:  No components found for: FE,  TIBC,  FERRITIN  Iron Deficiency Anemia:  No    IV Iron Therapy:  No  2017 ACC/AHA HF Guidelines:   intravenous iron replacement in patients with New York Heart Association (NYHA) class II and III HF and iron deficiency (ferritin <100 ng/ml or 100-300 ng/ml if transferrin saturation <20%), to improve functional status and QoL. Assessment/Plan:    1. Chronic systolic heart failure (HCC) - compensated, continue lasix and farxiga   2. Cardiomyopathy, ischemic - on Entresto, BB, farxiga, off sera for side effects   3. Coronary artery disease involving native coronary artery of native heart without angina pectoris - stable, on statin, ASA         Instructions:   Medications:no change in meds  Labs:monthly  Lifestyle Recommendations: Weigh yourself every day in the morning after urination, call [place] if wt increases 2-3lb in one day or 5lb in one week, Limit sodium to 2000mg/day and fluids to 2L or 64oz/day.    Follow up: 6 months      [facility]: [phone]      I appreciate the opportunity of cooperating in the care of this individual.    MELANIE Layne - CNP, CNP, 11/4/2022,9:39 AM    QUALITY MEASURES  1. Tobacco Cessation Counseling: NA  2. Retake of BP if >140/90:   NA  3. Documentation to PCP/referring for new patient:  Sent to PCP at close of office visit  4. CAD patient on anti-platelet: Yes  5. CAD patient on STATIN therapy:  Yes  6. Patient with CHF and aFib on anticoagulation:  Yes   7. Patient Education:Yes   8. BB for LVSD :  Yes   9. ACE/ARB for LVSD:  Yes   10.  Left Ventricular Ejection Fraction (LVEF) Assessment:  Yes

## 2022-11-10 ENCOUNTER — OFFICE VISIT (OUTPATIENT)
Dept: CARDIOLOGY CLINIC | Age: 72
End: 2022-11-10
Payer: COMMERCIAL

## 2022-11-10 VITALS
SYSTOLIC BLOOD PRESSURE: 118 MMHG | HEART RATE: 70 BPM | BODY MASS INDEX: 33.34 KG/M2 | OXYGEN SATURATION: 96 % | DIASTOLIC BLOOD PRESSURE: 80 MMHG | WEIGHT: 212.4 LBS | HEIGHT: 67 IN

## 2022-11-10 DIAGNOSIS — I25.5 CARDIOMYOPATHY, ISCHEMIC: Primary | ICD-10-CM

## 2022-11-10 DIAGNOSIS — I50.22 SYSTOLIC CHF, CHRONIC (HCC): ICD-10-CM

## 2022-11-10 DIAGNOSIS — I25.10 CORONARY ARTERY DISEASE INVOLVING NATIVE CORONARY ARTERY OF NATIVE HEART WITHOUT ANGINA PECTORIS: ICD-10-CM

## 2022-11-10 PROCEDURE — 99214 OFFICE O/P EST MOD 30 MIN: CPT | Performed by: NURSE PRACTITIONER

## 2022-11-10 PROCEDURE — 1123F ACP DISCUSS/DSCN MKR DOCD: CPT | Performed by: NURSE PRACTITIONER

## 2022-11-10 NOTE — PATIENT INSTRUCTIONS
Instructions:   Medications:no change in meds  Labs:monthly  Lifestyle Recommendations: Weigh yourself every day in the morning after urination, [facility] if wt increases 2-3lb in one day or 5lb in one week, Limit sodium to 2000mg/day and fluids to 2L or 64oz/day.    Follow up: 6 months      [facility]: [phone]

## 2023-05-09 ASSESSMENT — ENCOUNTER SYMPTOMS
RESPIRATORY NEGATIVE: 1
GASTROINTESTINAL NEGATIVE: 1

## 2023-05-09 NOTE — PROGRESS NOTES
[address]   Congestive Heart Failure    PrimaryCare Doctor:  MELANIE Motley NP  Primary Cardiologist: Emeka Pérez         Chief Complaint:  CHF    History of Present Illness:  Amira Gant is a 71 y.o. male with PMH CAD, MI, ICM, HFrEF (35-40%), HTN, HLD, AF who presents today for CHF f/u. No change at last OV  Today: He is feeling really good, wt is stable, and he denies chest pain, dyspnea, palpitations, orthopnea, PND, exertional chest pressure/discomfort, fatigue, early saiety, edema, syncope. He did not tolerate sera for headache and we discussed repeating echo today but he declines - feeling good and he is unsure if he would want ICD if indicated. ER Visit: No  Recent Hospitalization: No    Baseline Weight: 203  Wt Readings from Last 3 Encounters:   05/10/23 211 lb 9.6 oz (96 kg)   11/10/22 212 lb 6.4 oz (96.3 kg)   05/10/22 212 lb (96.2 kg)       EF: 35-40%  Cardiac Imaging: Echo 2/18/20  Summary:  The left atrium is moderately dilated. The anteroseptum wall is akinetic. The Aortic Valve is mildly calcified. No hemodynamically significant valvular aortic stenosis. The left ventricular function is moderately reduced. Overall left ventricular ejection fraction is estimated to be 35-40%. There is mild global hypokinesis of the left ventricle. Right ventricular systolic pressure is normal at <35 mmHg. Echo 10/5/2017:  Summary   -Limited echo to evaluate ejection fraction.   -Left ventricular function is reduced with ejection fraction visually estimated at 35-40%. There is anteroseptal hypokinesis. Echo 5/17/2017:  Summary  Technically limited examination to evaluated EF. Decreased left ventricular systolic function with anteroseptal and apical hypokinesis. Estimated EF 30%. Mild to moderate mitral regurgitation. The appears left atrium dilated. No evidence of any pericardial effusion.   There is moderate tricuspid regurgitation     MPI 6/16/2917:  Summary    -Medium-large

## 2023-05-10 ENCOUNTER — HOSPITAL ENCOUNTER (OUTPATIENT)
Age: 73
Discharge: HOME | End: 2023-05-10
Payer: COMMERCIAL

## 2023-05-10 ENCOUNTER — OFFICE VISIT (OUTPATIENT)
Dept: CARDIOLOGY CLINIC | Age: 73
End: 2023-05-10
Payer: COMMERCIAL

## 2023-05-10 VITALS
BODY MASS INDEX: 33.21 KG/M2 | HEART RATE: 71 BPM | OXYGEN SATURATION: 95 % | DIASTOLIC BLOOD PRESSURE: 84 MMHG | HEIGHT: 67 IN | SYSTOLIC BLOOD PRESSURE: 128 MMHG | WEIGHT: 211.6 LBS

## 2023-05-10 DIAGNOSIS — I50.22 SYSTOLIC CHF, CHRONIC (HCC): ICD-10-CM

## 2023-05-10 DIAGNOSIS — I25.5 CARDIOMYOPATHY, ISCHEMIC: ICD-10-CM

## 2023-05-10 DIAGNOSIS — I50.22 SYSTOLIC CHF, CHRONIC (HCC): Primary | ICD-10-CM

## 2023-05-10 DIAGNOSIS — I25.10 CORONARY ARTERY DISEASE INVOLVING NATIVE CORONARY ARTERY OF NATIVE HEART WITHOUT ANGINA PECTORIS: ICD-10-CM

## 2023-05-10 LAB
ANION GAP SERPL CALCULATED.3IONS-SCNC: 7 MMOL/L (ref 3–16)
BUN SERPL-MCNC: 19 MG/DL (ref 7–20)
CALCIUM SERPL-MCNC: 9.3 MG/DL (ref 8.3–10.6)
CHLORIDE SERPL-SCNC: 107 MMOL/L (ref 99–110)
CO2 SERPL-SCNC: 30 MMOL/L (ref 21–32)
CREAT SERPL-MCNC: 1.9 MG/DL (ref 0.8–1.3)
DEPRECATED RDW RBC AUTO: 14.1 % (ref 12.4–15.4)
GFR SERPLBLD CREATININE-BSD FMLA CKD-EPI: 37 ML/MIN/{1.73_M2}
GLUCOSE SERPL-MCNC: 147 MG/DL (ref 70–99)
HCT VFR BLD AUTO: 52.9 % (ref 40.5–52.5)
HGB BLD-MCNC: 17.6 G/DL (ref 13.5–17.5)
MCH RBC QN AUTO: 31.3 PG (ref 26–34)
MCHC RBC AUTO-ENTMCNC: 33.3 G/DL (ref 31–36)
MCV RBC AUTO: 93.9 FL (ref 80–100)
NT-PROBNP SERPL-MCNC: 446 PG/ML (ref 0–124)
PLATELET # BLD AUTO: 139 K/UL (ref 135–450)
PMV BLD AUTO: 9.8 FL (ref 5–10.5)
POTASSIUM SERPL-SCNC: 4.6 MMOL/L (ref 3.5–5.1)
RBC # BLD AUTO: 5.64 M/UL (ref 4.2–5.9)
SODIUM SERPL-SCNC: 144 MMOL/L (ref 136–145)
WBC # BLD AUTO: 5.6 K/UL (ref 4–11)

## 2023-05-10 PROCEDURE — 85027 COMPLETE CBC AUTOMATED: CPT

## 2023-05-10 PROCEDURE — 83880 ASSAY OF NATRIURETIC PEPTIDE: CPT

## 2023-05-10 PROCEDURE — 80048 BASIC METABOLIC PNL TOTAL CA: CPT

## 2023-05-10 PROCEDURE — 1123F ACP DISCUSS/DSCN MKR DOCD: CPT | Performed by: NURSE PRACTITIONER

## 2023-05-10 PROCEDURE — 99214 OFFICE O/P EST MOD 30 MIN: CPT | Performed by: NURSE PRACTITIONER

## 2023-05-10 PROCEDURE — 36415 COLL VENOUS BLD VENIPUNCTURE: CPT

## 2023-05-10 NOTE — PATIENT INSTRUCTIONS
Instructions:   Medications:no change in meds  Labs:today  Lifestyle Recommendations: Weigh yourself every day in the morning after urination, call Fredis Rascon if wt increases 2-3lb in one day or 5lb in one week, Limit sodium to 2000mg/day and fluids to 2L or 64oz/day.    Follow up: 6 months      [facility]: [phone]

## 2023-05-11 ENCOUNTER — TELEPHONE (OUTPATIENT)
Dept: CARDIOLOGY CLINIC | Age: 73
End: 2023-05-11

## 2023-05-11 RX ORDER — FUROSEMIDE 20 MG/1
10 TABLET ORAL EVERY OTHER DAY
Qty: 15 TABLET | Refills: 0 | Status: SHIPPED | OUTPATIENT
Start: 2023-05-11

## 2023-05-11 NOTE — TELEPHONE ENCOUNTER
----- Message from MELANIE Leiva CNP sent at 5/11/2023  8:25 AM EDT -----  Labs look good but please decrease lasix to every other day. Vickey So to reach patient and his daughter no answer and no voicemail. Will try again later. Spoke to patient today he would like us to call [facility] with the medication change.

## 2023-05-12 ENCOUNTER — TELEPHONE (OUTPATIENT)
Dept: CARDIOLOGY CLINIC | Age: 73
End: 2023-05-12

## 2023-05-12 NOTE — TELEPHONE ENCOUNTER
Rizwana Silva called to clarify the direction on the med of Furosemide 20mg, how should the Pt be taking it. Please advise.   Thank you

## 2023-11-08 ASSESSMENT — ENCOUNTER SYMPTOMS
RESPIRATORY NEGATIVE: 1
GASTROINTESTINAL NEGATIVE: 1

## 2023-11-08 NOTE — PROGRESS NOTES
[facility]   Congestive Heart Failure    PrimaryCare Doctor:  MELANIE Erickson NP  Primary Cardiologist: Mansi Phan         Chief Complaint:  CHF    History of Present Illness:  Samanta Loaiza is a 68 y.o. male with PMH CAD, MI, ICM, HFrEF (35-40%), HTN, HLD, AF who presents today for CHF f/u. No change at last OV  Today: He is feeling ok, cannot see at all now but wt is stable, and he denies chest pain, dyspnea, palpitations, orthopnea, PND, exertional chest pressure/discomfort, fatigue, early saiety, edema, syncope. He did not tolerate sera for headache and we discussed repeating echo but he declines - does not think he would want ICD if indicated. ER Visit: No  Recent Hospitalization: No    Baseline Weight: 210-215  Wt Readings from Last 3 Encounters:   11/10/23 96.2 kg (212 lb)   05/10/23 96 kg (211 lb 9.6 oz)   11/10/22 96.3 kg (212 lb 6.4 oz)       EF: 35-40%  Cardiac Imaging: Echo 2/18/20  Summary:  The left atrium is moderately dilated. The anteroseptum wall is akinetic. The Aortic Valve is mildly calcified. No hemodynamically significant valvular aortic stenosis. The left ventricular function is moderately reduced. Overall left ventricular ejection fraction is estimated to be 35-40%. There is mild global hypokinesis of the left ventricle. Right ventricular systolic pressure is normal at <35 mmHg. Echo 10/5/2017:  Summary   -Limited echo to evaluate ejection fraction.   -Left ventricular function is reduced with ejection fraction visually estimated at 35-40%. There is anteroseptal hypokinesis. Echo 5/17/2017:  Summary  Technically limited examination to evaluated EF. Decreased left ventricular systolic function with anteroseptal and apical hypokinesis. Estimated EF 30%. Mild to moderate mitral regurgitation. The appears left atrium dilated. No evidence of any pericardial effusion.   There is moderate tricuspid regurgitation     MPI 6/16/2917:  Summary    -Medium-large

## 2023-11-10 ENCOUNTER — OFFICE VISIT (OUTPATIENT)
Dept: CARDIOLOGY CLINIC | Age: 73
End: 2023-11-10
Payer: COMMERCIAL

## 2023-11-10 ENCOUNTER — HOSPITAL ENCOUNTER (OUTPATIENT)
Age: 73
Discharge: HOME | End: 2023-11-10
Payer: COMMERCIAL

## 2023-11-10 VITALS
RESPIRATION RATE: 21 BRPM | HEIGHT: 67 IN | WEIGHT: 212 LBS | HEART RATE: 70 BPM | DIASTOLIC BLOOD PRESSURE: 80 MMHG | SYSTOLIC BLOOD PRESSURE: 120 MMHG | OXYGEN SATURATION: 96 % | BODY MASS INDEX: 33.27 KG/M2

## 2023-11-10 DIAGNOSIS — I50.22 SYSTOLIC CHF, CHRONIC (HCC): ICD-10-CM

## 2023-11-10 DIAGNOSIS — I50.22 SYSTOLIC CHF, CHRONIC (HCC): Primary | ICD-10-CM

## 2023-11-10 DIAGNOSIS — I25.5 CARDIOMYOPATHY, ISCHEMIC: ICD-10-CM

## 2023-11-10 DIAGNOSIS — I25.10 CORONARY ARTERY DISEASE INVOLVING NATIVE CORONARY ARTERY OF NATIVE HEART WITHOUT ANGINA PECTORIS: ICD-10-CM

## 2023-11-10 PROCEDURE — 36415 COLL VENOUS BLD VENIPUNCTURE: CPT

## 2023-11-10 PROCEDURE — 1123F ACP DISCUSS/DSCN MKR DOCD: CPT | Performed by: NURSE PRACTITIONER

## 2023-11-10 PROCEDURE — 80048 BASIC METABOLIC PNL TOTAL CA: CPT

## 2023-11-10 PROCEDURE — 83880 ASSAY OF NATRIURETIC PEPTIDE: CPT

## 2023-11-10 PROCEDURE — 99214 OFFICE O/P EST MOD 30 MIN: CPT | Performed by: NURSE PRACTITIONER

## 2023-11-10 RX ORDER — GABAPENTIN 100 MG/1
CAPSULE ORAL
COMMUNITY
Start: 2023-10-16

## 2023-11-10 NOTE — PATIENT INSTRUCTIONS
Instructions:   Medications:no change in meds  Labs:today  Lifestyle Recommendations: Weigh yourself every day in the morning after urination, call [facility] M[facility]ormick if wt increases 2-3lb in one day or 5lb in one week, Limit sodium to 2000mg/day and fluids to 2L or 64oz/day.    Follow up: 6 months      [address]: [phone]

## 2023-11-11 LAB
ANION GAP SERPL CALCULATED.3IONS-SCNC: 12 MMOL/L (ref 3–16)
BUN SERPL-MCNC: 18 MG/DL (ref 7–20)
CALCIUM SERPL-MCNC: 9.4 MG/DL (ref 8.3–10.6)
CHLORIDE SERPL-SCNC: 106 MMOL/L (ref 99–110)
CO2 SERPL-SCNC: 27 MMOL/L (ref 21–32)
CREAT SERPL-MCNC: 1.8 MG/DL (ref 0.8–1.3)
GFR SERPLBLD CREATININE-BSD FMLA CKD-EPI: 39 ML/MIN/{1.73_M2}
GLUCOSE SERPL-MCNC: 158 MG/DL (ref 70–99)
NT-PROBNP SERPL-MCNC: 358 PG/ML (ref 0–124)
POTASSIUM SERPL-SCNC: 5 MMOL/L (ref 3.5–5.1)
SODIUM SERPL-SCNC: 145 MMOL/L (ref 136–145)

## 2023-11-14 ENCOUNTER — TELEPHONE (OUTPATIENT)
Dept: CARDIOLOGY CLINIC | Age: 73
End: 2023-11-14

## 2023-11-14 NOTE — TELEPHONE ENCOUNTER
----- Message from MELANIE Garnica CNP sent at 11/12/2023 10:28 AM EST -----  Labs look great, no new orders. Bernestine Risk to patient he verbalized understanding.

## 2024-05-30 ASSESSMENT — ENCOUNTER SYMPTOMS
GASTROINTESTINAL NEGATIVE: 1
RESPIRATORY NEGATIVE: 1

## 2024-05-30 NOTE — PROGRESS NOTES
[facility]   Congestive Heart Failure    PrimaryCare Doctor:  Ana Paul APRN - NP  Primary Cardiologist: Alvaro         Chief Complaint:  CHF    History of Present Illness:  Harvinder Stuart is a 74 y.o. male with PMH CAD, MI, ICM, HFrEF (35-40%), HTN, HLD, AF who presents today for CHF f/u. No change at last OV  Today: He is feeling ok, cannot see at all now but wt is stable, and he denies chest pain, dyspnea, palpitations, orthopnea, PND, exertional chest pressure/discomfort, fatigue, early saiety, edema, syncope.  He is on lasix every other day and sera both of which are different from our med list. We discussed repeating echo but he declines - does not think he would want ICD if indicated.     ER Visit: No  Recent Hospitalization: No    Baseline Weight: 210-215  Wt Readings from Last 3 Encounters:   05/31/24 96.6 kg (213 lb)   11/10/23 96.2 kg (212 lb)   05/10/23 96 kg (211 lb 9.6 oz)       EF: 35-40%  Cardiac Imaging: Echo 2/18/20  Summary:  The left atrium is moderately dilated.  The anteroseptum wall is akinetic.  The Aortic Valve is mildly calcified.  No hemodynamically significant valvular aortic stenosis.  The left ventricular function is moderately reduced.  Overall left ventricular ejection fraction is estimated to be 35-40%.  There is mild global hypokinesis of the left ventricle.  Right ventricular systolic pressure is normal at <35 mmHg.    Echo 10/5/2017:  Summary   -Limited echo to evaluate ejection fraction.   -Left ventricular function is reduced with ejection fraction visually estimated at 35-40%. There is anteroseptal hypokinesis.     Echo 5/17/2017:  Summary  Technically limited examination to evaluated EF.  Decreased left ventricular systolic function with anteroseptal and apical hypokinesis. Estimated EF 30%.  Mild to moderate mitral regurgitation.  The appears left atrium dilated.  No evidence of any pericardial effusion.  There is moderate tricuspid regurgitation     MPI

## 2024-05-31 ENCOUNTER — HOSPITAL ENCOUNTER (OUTPATIENT)
Age: 74
Discharge: HOME | End: 2024-05-31
Payer: COMMERCIAL

## 2024-05-31 ENCOUNTER — OFFICE VISIT (OUTPATIENT)
Dept: CARDIOLOGY CLINIC | Age: 74
End: 2024-05-31
Payer: COMMERCIAL

## 2024-05-31 VITALS
OXYGEN SATURATION: 98 % | HEART RATE: 68 BPM | HEIGHT: 67 IN | WEIGHT: 213 LBS | DIASTOLIC BLOOD PRESSURE: 60 MMHG | BODY MASS INDEX: 33.43 KG/M2 | SYSTOLIC BLOOD PRESSURE: 90 MMHG

## 2024-05-31 DIAGNOSIS — I25.5 ISCHEMIC CARDIOMYOPATHY: ICD-10-CM

## 2024-05-31 DIAGNOSIS — I50.22 SYSTOLIC CHF, CHRONIC (HCC): ICD-10-CM

## 2024-05-31 DIAGNOSIS — I25.10 CORONARY ARTERY DISEASE INVOLVING NATIVE CORONARY ARTERY OF NATIVE HEART WITHOUT ANGINA PECTORIS: ICD-10-CM

## 2024-05-31 DIAGNOSIS — I50.22 SYSTOLIC CHF, CHRONIC (HCC): Primary | ICD-10-CM

## 2024-05-31 LAB
ANION GAP SERPL CALCULATED.3IONS-SCNC: 8 MMOL/L (ref 3–16)
BUN SERPL-MCNC: 24 MG/DL (ref 7–20)
CALCIUM SERPL-MCNC: 9.3 MG/DL (ref 8.3–10.6)
CHLORIDE SERPL-SCNC: 100 MMOL/L (ref 99–110)
CO2 SERPL-SCNC: 28 MMOL/L (ref 21–32)
CREAT SERPL-MCNC: 2.1 MG/DL (ref 0.8–1.3)
GFR SERPLBLD CREATININE-BSD FMLA CKD-EPI: 33 ML/MIN/{1.73_M2}
GLUCOSE SERPL-MCNC: 260 MG/DL (ref 70–99)
NT-PROBNP SERPL-MCNC: 424 PG/ML (ref 0–124)
POTASSIUM SERPL-SCNC: 4.9 MMOL/L (ref 3.5–5.1)
SODIUM SERPL-SCNC: 136 MMOL/L (ref 136–145)

## 2024-05-31 PROCEDURE — 36415 COLL VENOUS BLD VENIPUNCTURE: CPT

## 2024-05-31 PROCEDURE — 99214 OFFICE O/P EST MOD 30 MIN: CPT | Performed by: NURSE PRACTITIONER

## 2024-05-31 PROCEDURE — 1123F ACP DISCUSS/DSCN MKR DOCD: CPT | Performed by: NURSE PRACTITIONER

## 2024-05-31 PROCEDURE — 80048 BASIC METABOLIC PNL TOTAL CA: CPT

## 2024-05-31 PROCEDURE — 83880 ASSAY OF NATRIURETIC PEPTIDE: CPT

## 2024-05-31 RX ORDER — SPIRONOLACTONE 25 MG/1
25 TABLET ORAL DAILY
COMMUNITY
Start: 2024-05-09

## 2024-05-31 RX ORDER — HYDROXYZINE HYDROCHLORIDE 25 MG/1
25 TABLET, FILM COATED ORAL 3 TIMES DAILY PRN
COMMUNITY

## 2024-05-31 RX ORDER — TIMOLOL MALEATE 5 MG/ML
1 SOLUTION/ DROPS OPHTHALMIC DAILY
COMMUNITY
Start: 2024-05-09

## 2024-05-31 NOTE — PATIENT INSTRUCTIONS
Instructions:   Medications:no change in meds  Labs:today  Lifestyle Recommendations: Weigh yourself every day in the morning after urination, call [facility] if wt increases 2-3lb in one day or 5lb in one week, Limit sodium to 2000mg/day and fluids to 2L or 64oz/day.   Follow up: 6 months      [facility]: [phone]

## 2024-06-03 ENCOUNTER — TELEPHONE (OUTPATIENT)
Dept: CARDIOLOGY CLINIC | Age: 74
End: 2024-06-03

## 2024-06-03 RX ORDER — SPIRONOLACTONE 25 MG/1
25 TABLET ORAL EVERY OTHER DAY
Qty: 30 TABLET | Refills: 1 | Status: SHIPPED | OUTPATIENT
Start: 2024-06-03

## 2024-06-03 NOTE — TELEPHONE ENCOUNTER
----- Message from MELANIE Newton CNP sent at 6/3/2024  8:22 AM EDT -----  Decrease sera to every other day. Recheck labs in 2 weeks. SANDRA    Tried to reach [facility] about medication change and to retrun our call.

## 2024-06-04 NOTE — TELEPHONE ENCOUNTER
Spoke to patient he stated to call [facility] spoke to his floor nurse angie she took a verbal but wanted a order faxed over. Sent fax today. Confirmation received.

## 2024-11-22 NOTE — PROGRESS NOTES
[facility]   Congestive Heart Failure    PrimaryCare Doctor:  Ana Paul APRN - NP  Primary Cardiologist: Alvaro         Chief Complaint:  CHF    History of Present Illness:  Harvinder Stuart is a 74 y.o. male with PMH CAD, MI, ICM, HFrEF (35-40%), HTN, HLD, AF who presents today for CHF f/u.   Today: He  has gained some wt, c/o feeling bloated but denies chest pain, dyspnea, palpitations, SOB, orthopnea, PND, exertional chest pressure/discomfort, fatigue, early saiety,  syncope.  We discussed repeating echo but he declines - does not think he would want ICD if indicated.     Home wt 215    ER Visit: No  Recent Hospitalization: No    Baseline Weight: 210-215  Wt Readings from Last 3 Encounters:   11/25/24 99.3 kg (219 lb)   05/31/24 96.6 kg (213 lb)   11/10/23 96.2 kg (212 lb)       EF: 35-40%  Cardiac Imaging: Echo 2/18/20  Summary:  The left atrium is moderately dilated.  The anteroseptum wall is akinetic.  The Aortic Valve is mildly calcified.  No hemodynamically significant valvular aortic stenosis.  The left ventricular function is moderately reduced.  Overall left ventricular ejection fraction is estimated to be 35-40%.  There is mild global hypokinesis of the left ventricle.  Right ventricular systolic pressure is normal at <35 mmHg.    Echo 10/5/2017:  Summary   -Limited echo to evaluate ejection fraction.   -Left ventricular function is reduced with ejection fraction visually estimated at 35-40%. There is anteroseptal hypokinesis.     Echo 5/17/2017:  Summary  Technically limited examination to evaluated EF.  Decreased left ventricular systolic function with anteroseptal and apical hypokinesis. Estimated EF 30%.  Mild to moderate mitral regurgitation.  The appears left atrium dilated.  No evidence of any pericardial effusion.  There is moderate tricuspid regurgitation     MPI 6/16/2917:  Summary    -Medium-large sized anterior fixed defect of severe intensity consistent    with infarction in

## 2024-11-25 ENCOUNTER — HOSPITAL ENCOUNTER (OUTPATIENT)
Age: 74
Discharge: HOME | End: 2024-11-25
Payer: COMMERCIAL

## 2024-11-25 ENCOUNTER — OFFICE VISIT (OUTPATIENT)
Dept: CARDIOLOGY CLINIC | Age: 74
End: 2024-11-25

## 2024-11-25 VITALS
WEIGHT: 219 LBS | OXYGEN SATURATION: 95 % | SYSTOLIC BLOOD PRESSURE: 118 MMHG | BODY MASS INDEX: 34.37 KG/M2 | HEART RATE: 72 BPM | DIASTOLIC BLOOD PRESSURE: 80 MMHG | HEIGHT: 67 IN

## 2024-11-25 DIAGNOSIS — I25.5 ISCHEMIC CARDIOMYOPATHY: ICD-10-CM

## 2024-11-25 DIAGNOSIS — I50.22 SYSTOLIC CHF, CHRONIC (HCC): ICD-10-CM

## 2024-11-25 DIAGNOSIS — I25.10 CORONARY ARTERY DISEASE INVOLVING NATIVE CORONARY ARTERY OF NATIVE HEART WITHOUT ANGINA PECTORIS: ICD-10-CM

## 2024-11-25 DIAGNOSIS — I50.22 SYSTOLIC CHF, CHRONIC (HCC): Primary | ICD-10-CM

## 2024-11-25 LAB
ANION GAP SERPL CALCULATED.3IONS-SCNC: 8 MMOL/L (ref 3–16)
BUN SERPL-MCNC: 17 MG/DL (ref 7–20)
CALCIUM SERPL-MCNC: 9.2 MG/DL (ref 8.3–10.6)
CHLORIDE SERPL-SCNC: 102 MMOL/L (ref 99–110)
CO2 SERPL-SCNC: 26 MMOL/L (ref 21–32)
CREAT SERPL-MCNC: 2 MG/DL (ref 0.8–1.3)
GFR SERPLBLD CREATININE-BSD FMLA CKD-EPI: 34 ML/MIN/{1.73_M2}
GLUCOSE SERPL-MCNC: 182 MG/DL (ref 70–99)
NT-PROBNP SERPL-MCNC: 718 PG/ML (ref 0–124)
POTASSIUM SERPL-SCNC: 4.6 MMOL/L (ref 3.5–5.1)
SODIUM SERPL-SCNC: 136 MMOL/L (ref 136–145)

## 2024-11-25 PROCEDURE — 83880 ASSAY OF NATRIURETIC PEPTIDE: CPT

## 2024-11-25 PROCEDURE — 36415 COLL VENOUS BLD VENIPUNCTURE: CPT

## 2024-11-25 PROCEDURE — 80048 BASIC METABOLIC PNL TOTAL CA: CPT

## 2024-11-25 ASSESSMENT — ENCOUNTER SYMPTOMS: ABDOMINAL DISTENTION: 1

## 2024-11-25 NOTE — PATIENT INSTRUCTIONS
Instructions:   Medications:no change in meds until I see labs  Labs:today  Lifestyle Recommendations: Weigh yourself every day in the morning after urination, call Liz if wt increases 2-3lb in one day or 5lb in one week, Limit sodium to 2000mg/day and fluids to 2L or 64oz/day.   Follow up: 6 months      [place] [facility]: [phone]

## 2024-11-26 ENCOUNTER — TELEPHONE (OUTPATIENT)
Dept: CARDIOLOGY CLINIC | Age: 74
End: 2024-11-26

## 2024-11-26 RX ORDER — FUROSEMIDE 20 MG/1
10 TABLET ORAL DAILY
Qty: 15 TABLET | Refills: 0 | Status: SHIPPED | OUTPATIENT
Start: 2024-11-26

## 2024-11-26 NOTE — TELEPHONE ENCOUNTER
----- Message from MELANIE Newton - CNP sent at 11/26/2024  8:39 AM EST -----  Increase lasix to 20mg daily, repeat BMP, BNP in 4 weeks please. SANDRA    Tried to call [facility] no answer, will fax the orders over to [facility].    Faxed orders to [facility], confirmation received.

## 2025-05-27 ASSESSMENT — ENCOUNTER SYMPTOMS: RESPIRATORY NEGATIVE: 1

## 2025-05-27 NOTE — PROGRESS NOTES
[facility]   Congestive Heart Failure    PrimaryCare Doctor:  Ana Paul APRN - NP  Primary Cardiologist: Alvaro         Chief Complaint:  CHF    History of Present Illness  The patient is a 75-year-old male PMH CAD, MI, ICM, HFrEF (35-40%), HTN, HLD, AF, blind who presents today for a heart failure follow-up.At his last OV, his lasix was increased and he did not get repeat labs so he has continued on the higher dose.     Today: his BP is low and he reports experiencing mild dizziness.  His  wt is stable and he denies chest pain, SOB, edema or palpitations. He does not know home BP readings      We have discussed repeating echo but he declines - does not think he would want ICD if indicated.     Home wt 215    ER Visit: No  Recent Hospitalization: No    Baseline Weight: 210-215  Wt Readings from Last 3 Encounters:   05/28/25 96.6 kg (213 lb)   11/25/24 99.3 kg (219 lb)   05/31/24 96.6 kg (213 lb)       EF: 35-40%  Cardiac Imaging: Echo 2/18/20  Summary:  The left atrium is moderately dilated.  The anteroseptum wall is akinetic.  The Aortic Valve is mildly calcified.  No hemodynamically significant valvular aortic stenosis.  The left ventricular function is moderately reduced.  Overall left ventricular ejection fraction is estimated to be 35-40%.  There is mild global hypokinesis of the left ventricle.  Right ventricular systolic pressure is normal at <35 mmHg.    Echo 10/5/2017:  Summary   -Limited echo to evaluate ejection fraction.   -Left ventricular function is reduced with ejection fraction visually estimated at 35-40%. There is anteroseptal hypokinesis.     Echo 5/17/2017:  Summary  Technically limited examination to evaluated EF.  Decreased left ventricular systolic function with anteroseptal and apical hypokinesis. Estimated EF 30%.  Mild to moderate mitral regurgitation.  The appears left atrium dilated.  No evidence of any pericardial effusion.  There is moderate tricuspid regurgitation

## 2025-05-28 ENCOUNTER — HOSPITAL ENCOUNTER (OUTPATIENT)
Age: 75
Discharge: HOME | End: 2025-05-28
Payer: COMMERCIAL

## 2025-05-28 ENCOUNTER — OFFICE VISIT (OUTPATIENT)
Dept: CARDIOLOGY CLINIC | Age: 75
End: 2025-05-28
Payer: COMMERCIAL

## 2025-05-28 VITALS
BODY MASS INDEX: 33.43 KG/M2 | DIASTOLIC BLOOD PRESSURE: 58 MMHG | HEART RATE: 70 BPM | WEIGHT: 213 LBS | OXYGEN SATURATION: 97 % | SYSTOLIC BLOOD PRESSURE: 89 MMHG | HEIGHT: 67 IN

## 2025-05-28 DIAGNOSIS — I25.10 CORONARY ARTERY DISEASE INVOLVING NATIVE CORONARY ARTERY OF NATIVE HEART WITHOUT ANGINA PECTORIS: ICD-10-CM

## 2025-05-28 DIAGNOSIS — I95.2 HYPOTENSION DUE TO DRUGS: ICD-10-CM

## 2025-05-28 DIAGNOSIS — I50.22 SYSTOLIC CHF, CHRONIC (HCC): ICD-10-CM

## 2025-05-28 DIAGNOSIS — I25.5 ISCHEMIC CARDIOMYOPATHY: ICD-10-CM

## 2025-05-28 DIAGNOSIS — I50.22 SYSTOLIC CHF, CHRONIC (HCC): Primary | ICD-10-CM

## 2025-05-28 LAB
ANION GAP SERPL CALCULATED.3IONS-SCNC: 11 MMOL/L (ref 3–16)
BUN SERPL-MCNC: 24 MG/DL (ref 7–20)
CALCIUM SERPL-MCNC: 9.4 MG/DL (ref 8.3–10.6)
CHLORIDE SERPL-SCNC: 105 MMOL/L (ref 99–110)
CO2 SERPL-SCNC: 26 MMOL/L (ref 21–32)
CREAT SERPL-MCNC: 2.3 MG/DL (ref 0.8–1.3)
GFR SERPLBLD CREATININE-BSD FMLA CKD-EPI: 29 ML/MIN/{1.73_M2}
GLUCOSE SERPL-MCNC: 225 MG/DL (ref 70–99)
NT-PROBNP SERPL-MCNC: 482 PG/ML (ref 0–124)
POTASSIUM SERPL-SCNC: 5.1 MMOL/L (ref 3.5–5.1)
SODIUM SERPL-SCNC: 142 MMOL/L (ref 136–145)

## 2025-05-28 PROCEDURE — 1123F ACP DISCUSS/DSCN MKR DOCD: CPT | Performed by: NURSE PRACTITIONER

## 2025-05-28 PROCEDURE — 83880 ASSAY OF NATRIURETIC PEPTIDE: CPT

## 2025-05-28 PROCEDURE — 1159F MED LIST DOCD IN RCRD: CPT | Performed by: NURSE PRACTITIONER

## 2025-05-28 PROCEDURE — 1160F RVW MEDS BY RX/DR IN RCRD: CPT | Performed by: NURSE PRACTITIONER

## 2025-05-28 PROCEDURE — 99214 OFFICE O/P EST MOD 30 MIN: CPT | Performed by: NURSE PRACTITIONER

## 2025-05-28 PROCEDURE — 80048 BASIC METABOLIC PNL TOTAL CA: CPT

## 2025-05-28 PROCEDURE — 36415 COLL VENOUS BLD VENIPUNCTURE: CPT

## 2025-05-28 ASSESSMENT — ENCOUNTER SYMPTOMS: GASTROINTESTINAL NEGATIVE: 1

## 2025-05-28 NOTE — PATIENT INSTRUCTIONS
Instructions:   Medications:no change in meds until I see labs  Labs:today  Lifestyle Recommendations: Weigh yourself every day in the morning after urination, call Liz if wt increases 2-3lb in one day or 5lb in one week, Limit sodium to 2000mg/day and fluids to 2L or 64oz/day.   Follow up: 6 months      [facility] Resource Line: [phone]

## 2025-05-29 ENCOUNTER — RESULTS FOLLOW-UP (OUTPATIENT)
Dept: CARDIOLOGY CLINIC | Age: 75
End: 2025-05-29

## 2025-05-29 DIAGNOSIS — I50.22 SYSTOLIC CHF, CHRONIC (HCC): Primary | ICD-10-CM

## 2025-05-29 RX ORDER — FUROSEMIDE 20 MG/1
10 TABLET ORAL AS NEEDED
Qty: 15 TABLET | Refills: 3 | Status: SHIPPED | OUTPATIENT
Start: 2025-05-29

## 2025-05-29 RX ORDER — SPIRONOLACTONE 25 MG/1
12.5 TABLET ORAL EVERY OTHER DAY
Qty: 30 TABLET | Refills: 1 | Status: SHIPPED | OUTPATIENT
Start: 2025-05-29

## 2025-05-29 RX ORDER — SACUBITRIL AND VALSARTAN 24; 26 MG/1; MG/1
1 TABLET, FILM COATED ORAL 2 TIMES DAILY
Qty: 60 TABLET | Refills: 3 | Status: SHIPPED | OUTPATIENT
Start: 2025-05-29

## 2025-05-29 NOTE — TELEPHONE ENCOUNTER
Pt states he is legally blind. He pressed button for nurse to come in his room so I can speak with her.   Spoke with Saadia, his nurse. Relayed medication changes and lab work needed. She states verbal understanding. Faxed orders successfully to [facility] at [phone].

## 2025-05-29 NOTE — TELEPHONE ENCOUNTER
----- Message from MELANIE Newton CNP sent at 5/29/2025  8:38 AM EDT -----  Labs look dry, stop daily lasix and decrease entresto to 24/26 bid and sera to 1/2 pill once a day, repeat labs in 2 weeks. KJDENIS